# Patient Record
Sex: MALE | Race: WHITE | Employment: OTHER | URBAN - METROPOLITAN AREA
[De-identification: names, ages, dates, MRNs, and addresses within clinical notes are randomized per-mention and may not be internally consistent; named-entity substitution may affect disease eponyms.]

---

## 2018-04-04 ENCOUNTER — HOSPITAL ENCOUNTER (OUTPATIENT)
Age: 73
Setting detail: SPECIMEN
Discharge: HOME | End: 2018-04-04
Payer: MEDICARE

## 2018-04-04 DIAGNOSIS — Z13.220 SCREENING CHOLESTEROL LEVEL: ICD-10-CM

## 2018-04-04 DIAGNOSIS — L40.50 PSA (PSORIATIC ARTHRITIS) (HCC): ICD-10-CM

## 2018-04-04 DIAGNOSIS — R53.83 FATIGUE, UNSPECIFIED TYPE: ICD-10-CM

## 2018-04-04 DIAGNOSIS — Z00.00 WELL ADULT EXAM: ICD-10-CM

## 2018-04-04 DIAGNOSIS — E55.9 VITAMIN D DEFICIENCY: ICD-10-CM

## 2018-04-04 DIAGNOSIS — R97.20 ABNORMAL PSA: ICD-10-CM

## 2018-04-04 LAB
ABSOLUTE EOS #: 0.14 K/UL (ref 0–0.44)
ABSOLUTE IMMATURE GRANULOCYTE: <0.03 K/UL (ref 0–0.3)
ABSOLUTE LYMPH #: 1.57 K/UL (ref 1.1–3.7)
ABSOLUTE MONO #: 0.43 K/UL (ref 0.1–1.2)
ALBUMIN SERPL-MCNC: 4.7 G/DL (ref 3.5–5.2)
ALBUMIN/GLOBULIN RATIO: 1.6 (ref 1–2.5)
ALP BLD-CCNC: 89 U/L (ref 40–129)
ALT SERPL-CCNC: 28 U/L (ref 5–41)
ANION GAP SERPL CALCULATED.3IONS-SCNC: 14 MMOL/L (ref 9–17)
AST SERPL-CCNC: 22 U/L
BASOPHILS # BLD: 0 % (ref 0–2)
BASOPHILS ABSOLUTE: <0.03 K/UL (ref 0–0.2)
BILIRUB SERPL-MCNC: 0.87 MG/DL (ref 0.3–1.2)
BUN BLDV-MCNC: 11 MG/DL (ref 8–23)
BUN/CREAT BLD: NORMAL (ref 9–20)
CALCIUM SERPL-MCNC: 9.4 MG/DL (ref 8.6–10.4)
CHLORIDE BLD-SCNC: 103 MMOL/L (ref 98–107)
CHOLESTEROL/HDL RATIO: 4.1
CHOLESTEROL: 207 MG/DL
CO2: 26 MMOL/L (ref 20–31)
CREAT SERPL-MCNC: 0.78 MG/DL (ref 0.7–1.2)
DIFFERENTIAL TYPE: ABNORMAL
EOSINOPHILS RELATIVE PERCENT: 2 % (ref 1–4)
GFR AFRICAN AMERICAN: >60 ML/MIN
GFR NON-AFRICAN AMERICAN: >60 ML/MIN
GFR SERPL CREATININE-BSD FRML MDRD: NORMAL ML/MIN/{1.73_M2}
GFR SERPL CREATININE-BSD FRML MDRD: NORMAL ML/MIN/{1.73_M2}
GLUCOSE FASTING: 85 MG/DL (ref 70–99)
HCT VFR BLD CALC: 46.8 % (ref 40.7–50.3)
HDLC SERPL-MCNC: 51 MG/DL
HEMOGLOBIN: 15.7 G/DL (ref 13–17)
IMMATURE GRANULOCYTES: 0 %
LDL CHOLESTEROL: 126 MG/DL (ref 0–130)
LYMPHOCYTES # BLD: 25 % (ref 24–43)
MCH RBC QN AUTO: 29.5 PG (ref 25.2–33.5)
MCHC RBC AUTO-ENTMCNC: 33.5 G/DL (ref 28.4–34.8)
MCV RBC AUTO: 88 FL (ref 82.6–102.9)
MONOCYTES # BLD: 7 % (ref 3–12)
NRBC AUTOMATED: 0 PER 100 WBC
PDW BLD-RTO: 13.2 % (ref 11.8–14.4)
PLATELET # BLD: 220 K/UL (ref 138–453)
PLATELET ESTIMATE: ABNORMAL
PMV BLD AUTO: 11.1 FL (ref 8.1–13.5)
POTASSIUM SERPL-SCNC: 4.2 MMOL/L (ref 3.7–5.3)
PROSTATE SPECIFIC ANTIGEN: 2.27 UG/L
RBC # BLD: 5.32 M/UL (ref 4.21–5.77)
RBC # BLD: ABNORMAL 10*6/UL
SEG NEUTROPHILS: 66 % (ref 36–65)
SEGMENTED NEUTROPHILS ABSOLUTE COUNT: 4.14 K/UL (ref 1.5–8.1)
SODIUM BLD-SCNC: 143 MMOL/L (ref 135–144)
T3 TOTAL: 135 NG/DL (ref 80–200)
T4 TOTAL: 7.8 UG/DL (ref 4.5–12)
TOTAL PROTEIN: 7.7 G/DL (ref 6.4–8.3)
TRIGL SERPL-MCNC: 150 MG/DL
TSH SERPL DL<=0.05 MIU/L-ACNC: 1.41 MIU/L (ref 0.3–5)
VITAMIN B-12: 1105 PG/ML (ref 232–1245)
VITAMIN D 25-HYDROXY: 46.6 NG/ML (ref 30–100)
VLDLC SERPL CALC-MCNC: ABNORMAL MG/DL (ref 1–30)
WBC # BLD: 6.3 K/UL (ref 3.5–11.3)
WBC # BLD: ABNORMAL 10*3/UL

## 2019-04-08 ENCOUNTER — HOSPITAL ENCOUNTER (OUTPATIENT)
Age: 74
Setting detail: SPECIMEN
Discharge: HOME | End: 2019-04-08
Payer: MEDICARE

## 2019-04-08 DIAGNOSIS — E07.9 THYROID DISORDER: ICD-10-CM

## 2019-04-08 DIAGNOSIS — R73.01 IFG (IMPAIRED FASTING GLUCOSE): ICD-10-CM

## 2019-04-08 DIAGNOSIS — E78.9 LIPID DISORDER: ICD-10-CM

## 2019-04-08 DIAGNOSIS — I10 ESSENTIAL HYPERTENSION: ICD-10-CM

## 2019-04-08 DIAGNOSIS — N42.9 PROSTATE DISORDER: ICD-10-CM

## 2019-04-08 DIAGNOSIS — E55.9 VITAMIN D DEFICIENCY: ICD-10-CM

## 2019-04-08 DIAGNOSIS — E53.8 VITAMIN B 12 DEFICIENCY: ICD-10-CM

## 2019-04-08 LAB
ALBUMIN SERPL-MCNC: 5 G/DL (ref 3.5–5.2)
ALBUMIN/GLOBULIN RATIO: 1.9 (ref 1–2.5)
ALP BLD-CCNC: 85 U/L (ref 40–129)
ALT SERPL-CCNC: 27 U/L (ref 5–41)
ANION GAP SERPL CALCULATED.3IONS-SCNC: 11 MMOL/L (ref 9–17)
AST SERPL-CCNC: 20 U/L
BILIRUB SERPL-MCNC: 0.69 MG/DL (ref 0.3–1.2)
BUN BLDV-MCNC: 10 MG/DL (ref 8–23)
BUN/CREAT BLD: NORMAL (ref 9–20)
CALCIUM SERPL-MCNC: 10 MG/DL (ref 8.6–10.4)
CHLORIDE BLD-SCNC: 104 MMOL/L (ref 98–107)
CHOLESTEROL/HDL RATIO: 3.9
CHOLESTEROL: 186 MG/DL
CO2: 27 MMOL/L (ref 20–31)
CREAT SERPL-MCNC: 0.83 MG/DL (ref 0.7–1.2)
GFR AFRICAN AMERICAN: >60 ML/MIN
GFR NON-AFRICAN AMERICAN: >60 ML/MIN
GFR SERPL CREATININE-BSD FRML MDRD: NORMAL ML/MIN/{1.73_M2}
GFR SERPL CREATININE-BSD FRML MDRD: NORMAL ML/MIN/{1.73_M2}
GLUCOSE FASTING: 90 MG/DL (ref 70–99)
HCT VFR BLD CALC: 47.4 % (ref 40.7–50.3)
HDLC SERPL-MCNC: 48 MG/DL
HEMOGLOBIN: 15.9 G/DL (ref 13–17)
LDL CHOLESTEROL: 105 MG/DL (ref 0–130)
MCH RBC QN AUTO: 29.7 PG (ref 25.2–33.5)
MCHC RBC AUTO-ENTMCNC: 33.5 G/DL (ref 28.4–34.8)
MCV RBC AUTO: 88.4 FL (ref 82.6–102.9)
NRBC AUTOMATED: 0 PER 100 WBC
PDW BLD-RTO: 13.2 % (ref 11.8–14.4)
PLATELET # BLD: 243 K/UL (ref 138–453)
PMV BLD AUTO: 10.8 FL (ref 8.1–13.5)
POTASSIUM SERPL-SCNC: 4.1 MMOL/L (ref 3.7–5.3)
PROSTATE SPECIFIC ANTIGEN: 2.37 UG/L
RBC # BLD: 5.36 M/UL (ref 4.21–5.77)
SODIUM BLD-SCNC: 142 MMOL/L (ref 135–144)
T4 TOTAL: 8.1 UG/DL (ref 4.5–12)
TOTAL PROTEIN: 7.7 G/DL (ref 6.4–8.3)
TRIGL SERPL-MCNC: 166 MG/DL
TSH SERPL DL<=0.05 MIU/L-ACNC: 1.47 MIU/L (ref 0.3–5)
VITAMIN B-12: 978 PG/ML (ref 232–1245)
VITAMIN D 25-HYDROXY: 59.6 NG/ML (ref 30–100)
VLDLC SERPL CALC-MCNC: ABNORMAL MG/DL (ref 1–30)
WBC # BLD: 5.5 K/UL (ref 3.5–11.3)

## 2020-12-02 ENCOUNTER — ANESTHESIA EVENT (OUTPATIENT)
Dept: OPERATING ROOM | Age: 75
DRG: 419 | End: 2020-12-02
Payer: MEDICARE

## 2020-12-02 ENCOUNTER — APPOINTMENT (OUTPATIENT)
Dept: CT IMAGING | Age: 75
DRG: 419 | End: 2020-12-02
Payer: MEDICARE

## 2020-12-02 ENCOUNTER — APPOINTMENT (OUTPATIENT)
Dept: ULTRASOUND IMAGING | Age: 75
DRG: 419 | End: 2020-12-02
Payer: MEDICARE

## 2020-12-02 ENCOUNTER — HOSPITAL ENCOUNTER (INPATIENT)
Age: 75
LOS: 2 days | Discharge: HOME | DRG: 419 | End: 2020-12-04
Attending: EMERGENCY MEDICINE | Admitting: INTERNAL MEDICINE
Payer: MEDICARE

## 2020-12-02 PROBLEM — K81.0 ACUTE CHOLECYSTITIS: Status: ACTIVE | Noted: 2020-12-02

## 2020-12-02 LAB
ABSOLUTE EOS #: 0.13 K/UL (ref 0–0.44)
ABSOLUTE IMMATURE GRANULOCYTE: 0.06 K/UL (ref 0–0.3)
ABSOLUTE LYMPH #: 1.29 K/UL (ref 1.1–3.7)
ABSOLUTE MONO #: 0.66 K/UL (ref 0.1–1.2)
ALBUMIN SERPL-MCNC: 5 G/DL (ref 3.5–5.2)
ALBUMIN/GLOBULIN RATIO: ABNORMAL (ref 1–2.5)
ALP BLD-CCNC: 83 U/L (ref 40–129)
ALT SERPL-CCNC: 22 U/L (ref 5–41)
ANION GAP SERPL CALCULATED.3IONS-SCNC: 11 MMOL/L (ref 9–17)
AST SERPL-CCNC: 19 U/L
BASOPHILS # BLD: 0 % (ref 0–2)
BASOPHILS ABSOLUTE: 0.04 K/UL (ref 0–0.2)
BILIRUB SERPL-MCNC: 0.48 MG/DL (ref 0.3–1.2)
BUN BLDV-MCNC: 18 MG/DL (ref 8–23)
BUN/CREAT BLD: 20 (ref 9–20)
CALCIUM SERPL-MCNC: 9.7 MG/DL (ref 8.6–10.4)
CHLORIDE BLD-SCNC: 101 MMOL/L (ref 98–107)
CO2: 25 MMOL/L (ref 20–31)
CREAT SERPL-MCNC: 0.89 MG/DL (ref 0.7–1.2)
DIFFERENTIAL TYPE: ABNORMAL
EKG ATRIAL RATE: 71 BPM
EKG P AXIS: 57 DEGREES
EKG P-R INTERVAL: 176 MS
EKG Q-T INTERVAL: 394 MS
EKG QRS DURATION: 84 MS
EKG QTC CALCULATION (BAZETT): 428 MS
EKG R AXIS: 46 DEGREES
EKG T AXIS: 39 DEGREES
EKG VENTRICULAR RATE: 71 BPM
EOSINOPHILS RELATIVE PERCENT: 1 % (ref 1–4)
GFR AFRICAN AMERICAN: >60 ML/MIN
GFR NON-AFRICAN AMERICAN: >60 ML/MIN
GFR SERPL CREATININE-BSD FRML MDRD: ABNORMAL ML/MIN/{1.73_M2}
GFR SERPL CREATININE-BSD FRML MDRD: ABNORMAL ML/MIN/{1.73_M2}
GLUCOSE BLD-MCNC: 163 MG/DL (ref 70–99)
HCT VFR BLD CALC: 43.2 % (ref 40.7–50.3)
HEMOGLOBIN: 15 G/DL (ref 13–17)
IMMATURE GRANULOCYTES: 1 %
LIPASE: 35 U/L (ref 13–60)
LYMPHOCYTES # BLD: 12 % (ref 24–43)
MCH RBC QN AUTO: 29.6 PG (ref 25.2–33.5)
MCHC RBC AUTO-ENTMCNC: 34.7 G/DL (ref 28.4–34.8)
MCV RBC AUTO: 85.2 FL (ref 82.6–102.9)
MONOCYTES # BLD: 6 % (ref 3–12)
NRBC AUTOMATED: 0 PER 100 WBC
PDW BLD-RTO: 12.7 % (ref 11.8–14.4)
PLATELET # BLD: 215 K/UL (ref 138–453)
PLATELET ESTIMATE: ABNORMAL
PMV BLD AUTO: 10.3 FL (ref 8.1–13.5)
POTASSIUM SERPL-SCNC: 3.9 MMOL/L (ref 3.7–5.3)
RBC # BLD: 5.07 M/UL (ref 4.21–5.77)
RBC # BLD: ABNORMAL 10*6/UL
SARS-COV-2, RAPID: NOT DETECTED
SARS-COV-2: NORMAL
SARS-COV-2: NORMAL
SEG NEUTROPHILS: 80 % (ref 36–65)
SEGMENTED NEUTROPHILS ABSOLUTE COUNT: 8.34 K/UL (ref 1.5–8.1)
SODIUM BLD-SCNC: 137 MMOL/L (ref 135–144)
SOURCE: NORMAL
TOTAL PROTEIN: 7.2 G/DL (ref 6.4–8.3)
TROPONIN INTERP: NORMAL
TROPONIN T: NORMAL NG/ML
TROPONIN, HIGH SENSITIVITY: 7 NG/L (ref 0–22)
WBC # BLD: 10.5 K/UL (ref 3.5–11.3)
WBC # BLD: ABNORMAL 10*3/UL

## 2020-12-02 PROCEDURE — 93010 ELECTROCARDIOGRAM REPORT: CPT | Performed by: INTERNAL MEDICINE

## 2020-12-02 PROCEDURE — 99222 1ST HOSP IP/OBS MODERATE 55: CPT | Performed by: INTERNAL MEDICINE

## 2020-12-02 PROCEDURE — 1200000000 HC SEMI PRIVATE

## 2020-12-02 PROCEDURE — 2580000003 HC RX 258: Performed by: EMERGENCY MEDICINE

## 2020-12-02 PROCEDURE — 85025 COMPLETE CBC W/AUTO DIFF WBC: CPT

## 2020-12-02 PROCEDURE — 36415 COLL VENOUS BLD VENIPUNCTURE: CPT

## 2020-12-02 PROCEDURE — 2500000003 HC RX 250 WO HCPCS: Performed by: NURSE PRACTITIONER

## 2020-12-02 PROCEDURE — 96375 TX/PRO/DX INJ NEW DRUG ADDON: CPT

## 2020-12-02 PROCEDURE — 80053 COMPREHEN METABOLIC PANEL: CPT

## 2020-12-02 PROCEDURE — 6360000004 HC RX CONTRAST MEDICATION: Performed by: EMERGENCY MEDICINE

## 2020-12-02 PROCEDURE — 76705 ECHO EXAM OF ABDOMEN: CPT

## 2020-12-02 PROCEDURE — 74177 CT ABD & PELVIS W/CONTRAST: CPT

## 2020-12-02 PROCEDURE — 83690 ASSAY OF LIPASE: CPT

## 2020-12-02 PROCEDURE — 93005 ELECTROCARDIOGRAM TRACING: CPT | Performed by: EMERGENCY MEDICINE

## 2020-12-02 PROCEDURE — 6360000002 HC RX W HCPCS: Performed by: EMERGENCY MEDICINE

## 2020-12-02 PROCEDURE — 99284 EMERGENCY DEPT VISIT MOD MDM: CPT

## 2020-12-02 PROCEDURE — 6360000002 HC RX W HCPCS: Performed by: NURSE PRACTITIONER

## 2020-12-02 PROCEDURE — APPSS60 APP SPLIT SHARED TIME 46-60 MINUTES: Performed by: NURSE PRACTITIONER

## 2020-12-02 PROCEDURE — 2580000003 HC RX 258: Performed by: NURSE PRACTITIONER

## 2020-12-02 PROCEDURE — 6370000000 HC RX 637 (ALT 250 FOR IP): Performed by: NURSE PRACTITIONER

## 2020-12-02 PROCEDURE — 6370000000 HC RX 637 (ALT 250 FOR IP): Performed by: EMERGENCY MEDICINE

## 2020-12-02 PROCEDURE — 87040 BLOOD CULTURE FOR BACTERIA: CPT

## 2020-12-02 PROCEDURE — 84484 ASSAY OF TROPONIN QUANT: CPT

## 2020-12-02 PROCEDURE — U0002 COVID-19 LAB TEST NON-CDC: HCPCS

## 2020-12-02 RX ORDER — ONDANSETRON 2 MG/ML
4 INJECTION INTRAMUSCULAR; INTRAVENOUS EVERY 6 HOURS PRN
Status: DISCONTINUED | OUTPATIENT
Start: 2020-12-02 | End: 2020-12-04 | Stop reason: HOSPADM

## 2020-12-02 RX ORDER — DEXTROSE, SODIUM CHLORIDE, AND POTASSIUM CHLORIDE 5; .45; .15 G/100ML; G/100ML; G/100ML
INJECTION INTRAVENOUS CONTINUOUS
Status: DISCONTINUED | OUTPATIENT
Start: 2020-12-02 | End: 2020-12-03

## 2020-12-02 RX ORDER — NICOTINE 21 MG/24HR
1 PATCH, TRANSDERMAL 24 HOURS TRANSDERMAL DAILY PRN
Status: DISCONTINUED | OUTPATIENT
Start: 2020-12-02 | End: 2020-12-04 | Stop reason: HOSPADM

## 2020-12-02 RX ORDER — POLYETHYLENE GLYCOL 3350 17 G/17G
17 POWDER, FOR SOLUTION ORAL DAILY PRN
Status: DISCONTINUED | OUTPATIENT
Start: 2020-12-02 | End: 2020-12-04 | Stop reason: HOSPADM

## 2020-12-02 RX ORDER — ONDANSETRON 4 MG/1
4 TABLET, ORALLY DISINTEGRATING ORAL EVERY 8 HOURS PRN
Status: DISCONTINUED | OUTPATIENT
Start: 2020-12-02 | End: 2020-12-04 | Stop reason: HOSPADM

## 2020-12-02 RX ORDER — SODIUM CHLORIDE 0.9 % (FLUSH) 0.9 %
10 SYRINGE (ML) INJECTION ONCE
Status: COMPLETED | OUTPATIENT
Start: 2020-12-02 | End: 2020-12-02

## 2020-12-02 RX ORDER — HYDRALAZINE HYDROCHLORIDE 20 MG/ML
10 INJECTION INTRAMUSCULAR; INTRAVENOUS EVERY 6 HOURS PRN
Status: DISCONTINUED | OUTPATIENT
Start: 2020-12-02 | End: 2020-12-04 | Stop reason: HOSPADM

## 2020-12-02 RX ORDER — METOPROLOL TARTRATE 5 MG/5ML
5 INJECTION INTRAVENOUS ONCE
Status: COMPLETED | OUTPATIENT
Start: 2020-12-02 | End: 2020-12-02

## 2020-12-02 RX ORDER — MULTIVIT WITH MINERALS/LUTEIN
1 TABLET ORAL DAILY
COMMUNITY

## 2020-12-02 RX ORDER — POTASSIUM CHLORIDE 7.45 MG/ML
10 INJECTION INTRAVENOUS PRN
Status: DISCONTINUED | OUTPATIENT
Start: 2020-12-02 | End: 2020-12-04 | Stop reason: HOSPADM

## 2020-12-02 RX ORDER — SODIUM CHLORIDE 0.9 % (FLUSH) 0.9 %
10 SYRINGE (ML) INJECTION PRN
Status: DISCONTINUED | OUTPATIENT
Start: 2020-12-02 | End: 2020-12-04 | Stop reason: HOSPADM

## 2020-12-02 RX ORDER — ONDANSETRON 2 MG/ML
4 INJECTION INTRAMUSCULAR; INTRAVENOUS ONCE
Status: COMPLETED | OUTPATIENT
Start: 2020-12-02 | End: 2020-12-02

## 2020-12-02 RX ORDER — TIMOLOL MALEATE 5 MG/ML
1 SOLUTION/ DROPS OPHTHALMIC 2 TIMES DAILY
Status: DISCONTINUED | OUTPATIENT
Start: 2020-12-02 | End: 2020-12-04 | Stop reason: HOSPADM

## 2020-12-02 RX ORDER — 0.9 % SODIUM CHLORIDE 0.9 %
80 INTRAVENOUS SOLUTION INTRAVENOUS ONCE
Status: COMPLETED | OUTPATIENT
Start: 2020-12-02 | End: 2020-12-02

## 2020-12-02 RX ORDER — MORPHINE SULFATE 4 MG/ML
4 INJECTION, SOLUTION INTRAMUSCULAR; INTRAVENOUS ONCE
Status: COMPLETED | OUTPATIENT
Start: 2020-12-02 | End: 2020-12-02

## 2020-12-02 RX ORDER — MAGNESIUM SULFATE 1 G/100ML
1 INJECTION INTRAVENOUS PRN
Status: DISCONTINUED | OUTPATIENT
Start: 2020-12-02 | End: 2020-12-04 | Stop reason: HOSPADM

## 2020-12-02 RX ORDER — POTASSIUM CHLORIDE 20 MEQ/1
40 TABLET, EXTENDED RELEASE ORAL PRN
Status: DISCONTINUED | OUTPATIENT
Start: 2020-12-02 | End: 2020-12-04 | Stop reason: HOSPADM

## 2020-12-02 RX ORDER — TIMOLOL MALEATE 5 MG/ML
1 SOLUTION OPHTHALMIC DAILY
COMMUNITY
End: 2021-12-13 | Stop reason: ALTCHOICE

## 2020-12-02 RX ORDER — LATANOPROST 50 UG/ML
1 SOLUTION/ DROPS OPHTHALMIC NIGHTLY
Status: DISCONTINUED | OUTPATIENT
Start: 2020-12-02 | End: 2020-12-04 | Stop reason: HOSPADM

## 2020-12-02 RX ORDER — ACETAMINOPHEN 325 MG/1
650 TABLET ORAL EVERY 6 HOURS PRN
Status: DISCONTINUED | OUTPATIENT
Start: 2020-12-02 | End: 2020-12-04 | Stop reason: HOSPADM

## 2020-12-02 RX ORDER — 0.9 % SODIUM CHLORIDE 0.9 %
1000 INTRAVENOUS SOLUTION INTRAVENOUS ONCE
Status: COMPLETED | OUTPATIENT
Start: 2020-12-02 | End: 2020-12-02

## 2020-12-02 RX ORDER — SODIUM CHLORIDE 0.9 % (FLUSH) 0.9 %
10 SYRINGE (ML) INJECTION EVERY 12 HOURS SCHEDULED
Status: DISCONTINUED | OUTPATIENT
Start: 2020-12-02 | End: 2020-12-04 | Stop reason: HOSPADM

## 2020-12-02 RX ORDER — HYDROMORPHONE HYDROCHLORIDE 1 MG/ML
1 INJECTION, SOLUTION INTRAMUSCULAR; INTRAVENOUS; SUBCUTANEOUS ONCE
Status: COMPLETED | OUTPATIENT
Start: 2020-12-02 | End: 2020-12-02

## 2020-12-02 RX ORDER — ONDANSETRON 4 MG/1
4 TABLET, ORALLY DISINTEGRATING ORAL ONCE
Status: COMPLETED | OUTPATIENT
Start: 2020-12-02 | End: 2020-12-02

## 2020-12-02 RX ORDER — ACETAMINOPHEN 650 MG/1
650 SUPPOSITORY RECTAL EVERY 6 HOURS PRN
Status: DISCONTINUED | OUTPATIENT
Start: 2020-12-02 | End: 2020-12-04

## 2020-12-02 RX ADMIN — METOPROLOL TARTRATE 25 MG: 25 TABLET, FILM COATED ORAL at 17:09

## 2020-12-02 RX ADMIN — POTASSIUM CHLORIDE, DEXTROSE MONOHYDRATE AND SODIUM CHLORIDE: 150; 5; 450 INJECTION, SOLUTION INTRAVENOUS at 06:54

## 2020-12-02 RX ADMIN — PIPERACILLIN AND TAZOBACTAM 3.38 G: 3; .375 INJECTION, POWDER, LYOPHILIZED, FOR SOLUTION INTRAVENOUS at 12:10

## 2020-12-02 RX ADMIN — SODIUM CHLORIDE 80 ML: 9 INJECTION, SOLUTION INTRAVENOUS at 03:37

## 2020-12-02 RX ADMIN — FAMOTIDINE 20 MG: 10 INJECTION INTRAVENOUS at 23:12

## 2020-12-02 RX ADMIN — FAMOTIDINE 20 MG: 10 INJECTION INTRAVENOUS at 12:11

## 2020-12-02 RX ADMIN — METOPROLOL TARTRATE 5 MG: 5 INJECTION INTRAVENOUS at 09:37

## 2020-12-02 RX ADMIN — TIMOLOL MALEATE 1 DROP: 5 SOLUTION/ DROPS OPHTHALMIC at 22:04

## 2020-12-02 RX ADMIN — POTASSIUM CHLORIDE, DEXTROSE MONOHYDRATE AND SODIUM CHLORIDE: 150; 5; 450 INJECTION, SOLUTION INTRAVENOUS at 17:13

## 2020-12-02 RX ADMIN — ONDANSETRON 4 MG: 4 TABLET, ORALLY DISINTEGRATING ORAL at 04:23

## 2020-12-02 RX ADMIN — SODIUM CHLORIDE 1000 ML: 9 INJECTION, SOLUTION INTRAVENOUS at 02:40

## 2020-12-02 RX ADMIN — ONDANSETRON 4 MG: 2 INJECTION INTRAMUSCULAR; INTRAVENOUS at 02:40

## 2020-12-02 RX ADMIN — PIPERACILLIN AND TAZOBACTAM 3.38 G: 3; .375 INJECTION, POWDER, LYOPHILIZED, FOR SOLUTION INTRAVENOUS at 18:30

## 2020-12-02 RX ADMIN — HYDROMORPHONE HYDROCHLORIDE 1 MG: 1 INJECTION, SOLUTION INTRAMUSCULAR; INTRAVENOUS; SUBCUTANEOUS at 04:23

## 2020-12-02 RX ADMIN — Medication 10 ML: at 03:36

## 2020-12-02 RX ADMIN — IOPAMIDOL 75 ML: 755 INJECTION, SOLUTION INTRAVENOUS at 03:36

## 2020-12-02 RX ADMIN — MORPHINE SULFATE 4 MG: 4 INJECTION, SOLUTION INTRAMUSCULAR; INTRAVENOUS at 02:40

## 2020-12-02 RX ADMIN — LATANOPROST 1 DROP: 50 SOLUTION OPHTHALMIC at 22:05

## 2020-12-02 RX ADMIN — PIPERACILLIN SODIUM AND TAZOBACTAM SODIUM 4.5 G: 4; .5 INJECTION, POWDER, LYOPHILIZED, FOR SOLUTION INTRAVENOUS at 05:00

## 2020-12-02 ASSESSMENT — PAIN DESCRIPTION - PAIN TYPE
TYPE: ACUTE PAIN
TYPE: ACUTE PAIN

## 2020-12-02 ASSESSMENT — PAIN SCALES - GENERAL
PAINLEVEL_OUTOF10: 9
PAINLEVEL_OUTOF10: 0
PAINLEVEL_OUTOF10: 10
PAINLEVEL_OUTOF10: 10

## 2020-12-02 ASSESSMENT — PAIN DESCRIPTION - FREQUENCY: FREQUENCY: CONTINUOUS

## 2020-12-02 ASSESSMENT — PAIN DESCRIPTION - DESCRIPTORS: DESCRIPTORS: ACHING

## 2020-12-02 ASSESSMENT — PAIN DESCRIPTION - LOCATION: LOCATION: ABDOMEN

## 2020-12-02 NOTE — CONSULTS
General Surgery:  Consult Note        PATIENT NAME: Al Valdes   YOB: 1945    ADMISSION DATE: 12/2/2020  1:48 AM     Admitting Provider: Marilee Hanley Physician: Dr. Lola Serrano: 12/2/2020    Chief Complaint:  Abdominal pain, n/v.  Consult Regarding:  Possible acute cholecystitis     HISTORY OF PRESENT ILLNESS:  The patient is a 70 y.o. male w/ h/o htn, cad who scented to the hospital with acute onset abdominal pain. States pain is all over. It started last night around 1030 after he had greasy spaghetti for dinner. He was nauseated and had 3 episodes of emesis. Pain persisted which prompted him to come to the emergency department. States that he had felt warm, no chills. No chest pain or difficulties breathing. During evaluation patient had a CT of his abdomen pelvis that had a distended gallbladder with a gallstone in the neck. No leukocytosis. Concern for acute cholecystitis. She states he has normal bowel function last bowel movement was yesterday without any melena or hematochezia. Prior to yesterday evening patient was tolerating a diet without nausea or vomiting he was in good health. he is overall active. Does have a history of heart catheterization without placement of stent. He takes medications for his blood pressure and daily baby aspirin. No prior abdominal surgery. Denies tobacco, alcohol or illicit drug use. Past Medical History:        Diagnosis Date    Hypertension        Past Surgical History:        Procedure Laterality Date    CARDIAC CATHETERIZATION  2006    [facility]       Medications Prior to Admission:   Not in a hospital admission. Allergies:  Patient has no known allergies.     Social History:   Social History     Socioeconomic History    Marital status:      Spouse name: Not on file    Number of children: Not on file    Years of education: Not on file    Highest education level: Not on file   Occupational History    Not on file   Social Needs    Financial resource strain: Not hard at all   [place] insecurity     Worry: Never true     Inability: Never true   [employer] needs     Medical: No     Non-medical: No   Tobacco Use    Smoking status: Never Smoker    Smokeless tobacco: Never Used   Substance and Sexual Activity    Alcohol use: No     Alcohol/week: 0.0 standard drinks    Drug use: Not on file    Sexual activity: Yes     Partners: Female   Lifestyle    Physical activity     Days per week: Not on file     Minutes per session: Not on file    Stress: Not on file   Relationships    Social connections     Talks on phone: Not on file     Gets together: Not on file     Attends [employer]ism service: Not on file     Active member of club or organization: Not on file     Attends meetings of clubs or organizations: Not on file     Relationship status: Not on file    Intimate partner violence     Fear of current or ex partner: Not on file     Emotionally abused: Not on file     Physically abused: Not on file     Forced sexual activity: Not on file   Other Topics Concern    Not on file   Social History Narrative    Not on file       Family History:       Problem Relation Age of Onset    Cancer Mother     Cirrhosis Father        REVIEW OF SYSTEMS:    CONSTITUTIONAL: Denies recent weight loss, fatigue, fevers, chills. HEENT: Denies rhinorrhea, dysphagia, odynphagia. CARDIOVASCULAR:CAD Denies history recent chest pain. RESPIRATORY: Denies recent history of shortness of breath or history of PE. GASTROINTESTINAL:see hpi  GENITOURINARY: Denies increased frequency or dysuria. HEMATOLOGIC/LYMPHATIC: Denies history of anemia or DVTs. ENDOCRINE: Denies history of thyroid problems or diabetes. NEURO: Denies history of CVA, TIA. Review of systems negative unless listed above.     PHYSICAL EXAM:    VITALS:  BP (!) 182/111   Pulse 69   Temp 98.4 °F (36.9 °C) (Oral)   Resp 16   Ht 5' 10\" (1.778 m)   Wt 178 lb (80.7 kg)   SpO2 98%   BMI 25.54 kg/m²   INTAKE/OUTPUT:   No intake or output data in the 24 hours ending 12/02/20 0523    CONSTITUTIONAL:  awake, alert, not distressed and mildly obese  HEENT: Normocephalic/atraumatic, without obvious abnormality. NECK:  Supple, symmetrical, trachea midline   CARDIOVASCULAR: s1+s2  LUNGS: equal and symmetric chest rise/fall, non-labored   ABDOMEN: soft, nd, ttp RUQ w/ localized guarding. Neg plaat. No scars noted. MUSCULOSKELETAL: Muscle strength intact in all extremities bilaterally. NEUROLOGIC: CN II- XII intact. Gross motor intact without focal weakness. SKIN: No cyanosis, rashes, or edema noted.    Orientation:   oriented to person, place, and time      CBC with Differential:    Lab Results   Component Value Date    WBC 10.5 12/02/2020    RBC 5.07 12/02/2020    HGB 15.0 12/02/2020    HCT 43.2 12/02/2020     12/02/2020    MCV 85.2 12/02/2020    MCH 29.6 12/02/2020    MCHC 34.7 12/02/2020    RDW 12.7 12/02/2020    LYMPHOPCT 12 12/02/2020    MONOPCT 6 12/02/2020    BASOPCT 0 12/02/2020    MONOSABS 0.66 12/02/2020    LYMPHSABS 1.29 12/02/2020    EOSABS 0.13 12/02/2020    BASOSABS 0.04 12/02/2020    DIFFTYPE NOT REPORTED 12/02/2020     CMP:    Lab Results   Component Value Date     12/02/2020    K 3.9 12/02/2020     12/02/2020    CO2 25 12/02/2020    BUN 18 12/02/2020    CREATININE 0.89 12/02/2020    GFRAA >60 12/02/2020    LABGLOM >60 12/02/2020    GLUCOSE 163 12/02/2020    PROT 7.2 12/02/2020    LABALBU 5.0 12/02/2020    CALCIUM 9.7 12/02/2020    BILITOT 0.48 12/02/2020    ALKPHOS 83 12/02/2020    AST 19 12/02/2020    ALT 22 12/02/2020     BMP:    Lab Results   Component Value Date     12/02/2020    K 3.9 12/02/2020     12/02/2020    CO2 25 12/02/2020    BUN 18 12/02/2020    LABALBU 5.0 12/02/2020    CREATININE 0.89 12/02/2020    CALCIUM 9.7 12/02/2020    GFRAA >60 12/02/2020    LABGLOM >60 12/02/2020    GLUCOSE 163 12/02/2020     Hepatic Function Panel:    Lab Results   Component Value Date    ALKPHOS 83 12/02/2020    ALT 22 12/02/2020    AST 19 12/02/2020    PROT 7.2 12/02/2020    BILITOT 0.48 12/02/2020    LABALBU 5.0 12/02/2020       Pertinent Radiology:   Ct Abdomen Pelvis W Iv Contrast Additional Contrast? None    Result Date: 12/2/2020  EXAMINATION: CT OF THE ABDOMEN AND PELVIS WITH CONTRAST 12/2/2020 3:16 am TECHNIQUE: CT of the abdomen and pelvis was performed with the administration of intravenous contrast. Multiplanar reformatted images are provided for review. Dose modulation, iterative reconstruction, and/or weight based adjustment of the mA/kV was utilized to reduce the radiation dose to as low as reasonably achievable. COMPARISON: None. HISTORY: ORDERING SYSTEM PROVIDED HISTORY: pain TECHNOLOGIST PROVIDED HISTORY: pain Reason for Exam: abd pain Acuity: Acute Type of Exam: Initial Additional signs and symptoms: N/V Relevant Medical/Surgical History: HTN FINDINGS: Lower Chest: No acute abnormality. Liver: Normal. Gallbladder and Bile Ducts: Distended gallbladder with a 5 mm intraluminal stone near the gallbladder neck. No pericholecystic fluid. No biliary ductal dilatation. Spleen: Normal. Adrenal Glands: Normal. Pancreas: Normal. Genitourinary: Both kidneys are symmetric in size and enhancement. No urinary stones or hydronephrosis. Both ureters are normal in course and caliber. Mild haziness about the urinary bladder. Prostate gland measures 5.0 x 4.0 cm. Bowel: The stomach is incompletely distended and not well evaluated. The small bowel and colon are normal in caliber. Normal appendix. Colonic diverticulosis without acute diverticulitis. . Vasculature: Normal. Bones and Soft Tissues: No acute abnormality. Retroperitoneum/Mesentery: No intraperitoneal free air, ascites or fluid collection. No lymphadenopathy in the abdomen or pelvis. 1.  Distended gallbladder with a 5 mm intraluminal stone near the gallbladder neck.   No pericholecystic fluid. Gallbladder ultrasound may be helpful for further evaluation, especially if there is high clinical concern for acute cholecystitis. 2.  No bowel obstruction. Normal appendix. 3.  Colonic diverticulosis without acute diverticulitis. 4.  Mild haziness about the urinary bladder could relate to underlying cystitis. No obstructive uropathy. ASSESSMENT:  Active Hospital Problems    Diagnosis Date Noted    Acute cholecystitis [K81.0] 12/02/2020       71 yo M acute onset abdominal pain associated w/ n/v. Most likely acute cholecystitis. Plan:  1. Rec admit to medicine  2. Will get RUQ US  3. Based on imaging and exam pt most likely with Acute cholecystitis. Cont abx, will plan for cholecystectomy, timing to be determined. 4. Keep NPO  5. IVF      Electronically signed by Dahlia Ojeda DO  on 12/2/2020 at 5:23 AM   Attending Physician Statement  I have discussed the case, including pertinent history and exam findings with the resident. I agree with the assessment, plan and orders as documented by the resident. Findings c/w acute cholecystitis.   Tentative cholecystectomy tomorrow

## 2020-12-02 NOTE — PROGRESS NOTES
Transitions of Care Pharmacy Service   Medication Review    The patient's list of current home medications has been reviewed. Source(s) of information: patient, Arabella, Epic      Please feel free to call me with any questions about this encounter. Thank you.     Alicia Acuna, [facility]   Transitions of Care Pharmacy Service  Phone:  [phone]  Fax: [phone]      Electronically signed by Alicia Acuna [facility] on 12/2/2020 at 12:53 PM           Medications Prior to Admission:   timolol (TIMOPTIC-XE) 0.5 % ophthalmic gel-forming, Place 1 drop into both eyes daily  vitamin D (CHOLECALCIFEROL) 25 MCG (1000 UT) TABS tablet, Take 1,000 Units by mouth daily  Multiple Vitamins-Minerals (CENTRUM SILVER) TABS, Take 1 tablet by mouth daily  metoprolol tartrate (LOPRESSOR) 25 MG tablet, TAKE 1 TABLET DAILY  latanoprost (XALATAN) 0.005 % ophthalmic solution, Place 1 drop into both eyes nightly   aspirin 81 MG tablet, Take 81 mg by mouth daily

## 2020-12-02 NOTE — H&P
[facility]  Office: [address], DO, So Bahman, DO, Desi Manifold, DO, Hola Arriaza Blood, DO, Con Herndon MD, Anastacio Blas MD, Karma Celeste MD, Dallas Doll MD, Gerson Jaffe MD, Gina Mahan MD, Nancy Mcclelland MD, Kyle Emery MD, Yue Velazquez MD, Jaun Lopez DO, Alva Gotti MD, Herminio Davenport MD, aFdumo Kyle DO, Al Valdes MD,  Anthony Hernandez DO, Norris Holter, MD, Madonna Forrest MD, Chayo Marin, CNP, [facility], CNP, Celine Vogel, CNP, Dhiraj Seaman, CNS, David Oropeza, CNP, London Cunningham, CNP, Ursula Mendes, CNP, Sary Francisco, CNP, Walker Vann, CNP, Loyd Cantu PA-C, Dinesh Lora [facility], Sylvia Haney, CNP, Obdulia Card, CNP, Zurdo Loop, CNP, Reginald Monae, CNP, Jessica Brandt, CNP         [address]    HISTORY AND PHYSICAL EXAMINATION            Date:   12/2/2020  Patient name:  Ariana Marmolejo  Date of admission:  12/2/2020  1:48 AM  MRN:   [id number]  Account:  [de-identified]  YOB: 1945  PCP:    Georgina Aguila MD  Room:   [address] [place] [address]  Code Status:    Full Code    Chief Complaint:     Chief Complaint   Patient presents with    Abdominal Pain       History Obtained From:     patient    History of Present Illness:     Ariana Marmolejo is a 70 y.o.  male who presents with Abdominal Pain   and is admitted to the hospital for the management of Acute cholecystitis. Patient states he started having generalized abdominal pain associated with nausea and vomiting that started at 10 pm last night and got progressively worse. He has never had any similar previous episodes. He denies any CP, SOB, diarrhea, constipation, fever or recent sick contacts. CT abd/pelvis consistent with acute cholecystitis.  His nausea and vomiting have since resolved  Past Medical History:     Past Medical History:   Diagnosis Date    GERD without esophagitis     Hypertension Past Surgical History:     Past Surgical History:   Procedure Laterality Date    [facility]AC [facility]ATION  [facility]    COLONOSCOPY      polyp removal    TONSILLECTOMY      VASECTOMY          Medications Prior to Admission:     Prior to Admission medications    Medication Sig Start Date End Date Taking? Authorizing Provider   timolol (TIMOPTIC-XE) 0.5 % ophthalmic gel-forming Place 1 drop into both eyes daily   Yes Historical Provider, MD   vitamin D (CHOLECALCIFEROL) 25 MCG (1000 UT) TABS tablet Take 1,000 Units by mouth daily   Yes Historical Provider, MD   Multiple Vitamins-Minerals (CENTRUM SILVER) TABS Take 1 tablet by mouth daily   Yes Historical Provider, MD   metoprolol tartrate (LOPRESSOR) 25 MG tablet TAKE 1 TABLET DAILY 10/9/20  Yes Isidra Zapata MD   latanoprost (XALATAN) 0.005 % ophthalmic solution Place 1 drop into both eyes nightly  2/6/19  Yes Historical Provider, MD   aspirin 81 MG tablet Take 81 mg by mouth daily   Yes Historical Provider, MD        Allergies:     Patient has no known allergies. Social History:     Tobacco:    reports that he has never smoked. He has never used smokeless tobacco.  Alcohol:      reports no history of alcohol use. Drug Use:  has no history on file for drug. Family History:     Family History   Problem Relation Age of Onset    Cancer Mother     Cirrhosis Father        Review of Systems:     Positive and Negative as described in HPI. CONSTITUTIONAL:  negative for fevers, chills, sweats, fatigue, weight loss  HEENT:  negative for vision, hearing changes, runny nose, throat pain  RESPIRATORY:  negative for shortness of breath, cough, congestion, wheezing  CARDIOVASCULAR:  negative for chest pain, palpitations  GASTROINTESTINAL:  positive for nausea, vomiting, abdominal pain.  Negative for diarrhea, constipation, change in bowel habits  GENITOURINARY:  negative for difficulty of urination, burning with urination, frequency   INTEGUMENT: negative for rash, skin lesions, easy bruising   HEMATOLOGIC/LYMPHATIC:  negative for swelling/edema   ALLERGIC/IMMUNOLOGIC:  negative for urticaria , itching  ENDOCRINE:  negative increase in drinking, increase in urination, hot or cold intolerance  MUSCULOSKELETAL:  negative joint pains, muscle aches, swelling of joints  NEUROLOGICAL:  negative for headaches, dizziness, lightheadedness, numbness, pain, tingling extremities  BEHAVIOR/PSYCH:  negative for depression, anxiety    Physical Exam:   BP (!) 169/83   Pulse 93   Temp 97 °F (36.1 °C) (Oral)   Resp 16   Ht 5' 10\" (1.778 m)   Wt 187 lb (84.8 kg)   SpO2 97%   BMI 26.83 kg/m²   Temp (24hrs), Av.7 °F (36.5 °C), Min:97 °F (36.1 °C), Max:98.4 °F (36.9 °C)    No results for input(s): POCGLU in the last 72 hours. No intake or output data in the 24 hours ending 20 1603    General Appearance:  alert, well appearing, and in no acute distress  Mental status: oriented to person, place, and time  Head:  normocephalic, atraumatic  Eye: no icterus, redness, pupils equal and reactive, extraocular eye movements intact, conjunctiva clear  Ear: normal external ear, no discharge, hearing intact  Nose:  no drainage noted  Mouth: mucous membranes moist  Neck: supple, no carotid bruits, thyroid not palpable  Lungs: Bilateral equal air entry, clear to auscultation, no wheezing, rales or rhonchi, normal effort  Cardiovascular: normal rate, regular rhythm, no murmur, gallop, rub.   Abdomen: Soft, RUQ tender to palpation, nondistended, normal bowel sounds, no hepatomegaly or splenomegaly  Neurologic: There are no new focal motor or sensory deficits, normal muscle tone and bulk, no abnormal sensation, normal speech, cranial nerves II through XII grossly intact  Skin: No gross lesions, rashes, bruising or bleeding on exposed skin area  Extremities:  peripheral pulses palpable, no pedal edema or calf pain with palpation  Psych: normal affect     Investigations: Laboratory Testing:  Recent Results (from the past 24 hour(s))   CBC Auto Differential    Collection Time: 12/02/20  2:38 AM   Result Value Ref Range    WBC 10.5 3.5 - 11.3 k/uL    RBC 5.07 4.21 - 5.77 m/uL    Hemoglobin 15.0 13.0 - 17.0 g/dL    Hematocrit 43.2 40.7 - 50.3 %    MCV 85.2 82.6 - 102.9 fL    MCH 29.6 25.2 - 33.5 pg    MCHC 34.7 28.4 - 34.8 g/dL    RDW 12.7 11.8 - 14.4 %    Platelets 687 577 - 105 k/uL    MPV 10.3 8.1 - 13.5 fL    NRBC Automated 0.0 0.0 per 100 WBC    Differential Type NOT REPORTED     Seg Neutrophils 80 (H) 36 - 65 %    Lymphocytes 12 (L) 24 - 43 %    Monocytes 6 3 - 12 %    Eosinophils % 1 1 - 4 %    Basophils 0 0 - 2 %    Immature Granulocytes 1 (H) 0 %    Segs Absolute 8.34 (H) 1.50 - 8.10 k/uL    Absolute Lymph # 1.29 1.10 - 3.70 k/uL    Absolute Mono # 0.66 0.10 - 1.20 k/uL    Absolute Eos # 0.13 0.00 - 0.44 k/uL    Basophils Absolute 0.04 0.00 - 0.20 k/uL    Absolute Immature Granulocyte 0.06 0.00 - 0.30 k/uL    WBC Morphology NOT REPORTED     RBC Morphology NOT REPORTED     Platelet Estimate NOT REPORTED    Comprehensive Metabolic Panel w/ Reflex to MG    Collection Time: 12/02/20  2:38 AM   Result Value Ref Range    Glucose 163 (H) 70 - 99 mg/dL    BUN 18 8 - 23 mg/dL    CREATININE 0.89 0.70 - 1.20 mg/dL    Bun/Cre Ratio 20 9 - 20    Calcium 9.7 8.6 - 10.4 mg/dL    Sodium 137 135 - 144 mmol/L    Potassium 3.9 3.7 - 5.3 mmol/L    Chloride 101 98 - 107 mmol/L    CO2 25 20 - 31 mmol/L    Anion Gap 11 9 - 17 mmol/L    Alkaline Phosphatase 83 40 - 129 U/L    ALT 22 5 - 41 U/L    AST 19 <40 U/L    Total Bilirubin 0.48 0.3 - 1.2 mg/dL    Total Protein 7.2 6.4 - 8.3 g/dL    Alb 5.0 3.5 - 5.2 g/dL    Albumin/Globulin Ratio NOT REPORTED 1.0 - 2.5    GFR Non-African American >60 >60 mL/min    GFR African American >60 >60 mL/min    GFR Comment          GFR Staging NOT REPORTED    Troponin    Collection Time: 12/02/20  2:38 AM   Result Value Ref Range    Troponin, High Sensitivity 7 0 - 22 ng/L    Troponin T NOT REPORTED <0.03 ng/mL    Troponin Interp NOT REPORTED    Lipase    Collection Time: 12/02/20  2:38 AM   Result Value Ref Range    Lipase 35 13 - 60 U/L   EKG 12 Lead    Collection Time: 12/02/20  2:50 AM   Result Value Ref Range    Ventricular Rate 71 BPM    Atrial Rate 71 BPM    P-R Interval 176 ms    QRS Duration 84 ms    Q-T Interval 394 ms    QTc Calculation (Bazett) 428 ms    P Axis 57 degrees    R Axis 46 degrees    T Axis 39 degrees   COVID-19    Collection Time: 12/02/20  4:53 AM    Specimen: Other   Result Value Ref Range    SARS-CoV-2          SARS-CoV-2, Rapid Not Detected Not Detected    Source . NASOPHARYNGEAL SWAB     SARS-CoV-2             Imaging/Diagnostics:  Ct Abdomen Pelvis W Iv Contrast Additional Contrast? None    Result Date: 12/2/2020  1. Distended gallbladder with a 5 mm intraluminal stone near the gallbladder neck. No pericholecystic fluid. Gallbladder ultrasound may be helpful for further evaluation, especially if there is high clinical concern for acute cholecystitis. 2.  No bowel obstruction. Normal appendix. 3.  Colonic diverticulosis without acute diverticulitis. 4.  Mild haziness about the urinary bladder could relate to underlying cystitis. No obstructive uropathy. Us Gallbladder Ruq    Result Date: 12/2/2020  Negative right upper quadrant ultrasound. RECOMMENDATIONS: If there is continued clinical concern for cholecystitis then consider further assessment with HIDA scan. Assessment :      Hospital Problems           Last Modified POA    * (Principal) Acute cholecystitis 12/2/2020 Yes    Essential hypertension 12/2/2020 Yes    Vitamin D deficiency 12/2/2020 Yes    GERD without esophagitis 12/2/2020 Yes          Plan:     Patient status inpatient in the Med/Surge    1. Resume select home meds   2. Monitor labs, replace electrolytes as needed  3. IV fluids for hydration  4. General surgery consult  5.  Full liquid diet, NPO after midnight for

## 2020-12-02 NOTE — ED PROVIDER NOTES
EMERGENCY DEPARTMENT ENCOUNTER    Pt Name: Flor Will  MRN: [id number]  [place] 1945  Date of evaluation: 20  CHIEF COMPLAINT       Chief Complaint   Patient presents with    Abdominal Pain     HISTORY OF PRESENT ILLNESS   Patient is a 72-year-old male with PMH of hypertension who presents to the ED for evaluation abdominal pain. Pain started last night approximately 10:00 PM.  Pain is generalized across abdomen. He vomited twice, abdominal pain improved for short while however worsened thereafter. He feels nauseated this time. Last bowel movement was yesterday and was normal.  No fevers, chest pain, shortness of breath, changes in urine or stool. No history of abdominal surgeries. REVIEW OF SYSTEMS     Review of Systems   All other systems reviewed and are negative. PASTMEDICAL HISTORY     Past Medical History:   Diagnosis Date    Hypertension      SURGICAL HISTORY       Past Surgical History:   Procedure Laterality Date    CARDIAC CATHETERIZATION      [facility]     CURRENT MEDICATIONS       Previous Medications    ASPIRIN 81 MG TABLET    Take 81 mg by mouth daily    LATANOPROST (XALATAN) 0.005 % OPHTHALMIC SOLUTION        METOPROLOL TARTRATE (LOPRESSOR) 25 MG TABLET    TAKE 1 TABLET DAILY     ALLERGIES     has No Known Allergies. FAMILY HISTORY     He indicated that his mother is . He indicated that his father is . He indicated that both of his brothers are alive. SOCIAL HISTORY       Social History     Tobacco Use    Smoking status: Never Smoker    Smokeless tobacco: Never Used   Substance Use Topics    Alcohol use: No     Alcohol/week: 0.0 standard drinks    Drug use: Not on file     PHYSICAL EXAM     INITIAL VITALS: BP (!) 182/111   Pulse 69   Temp 98.4 °F (36.9 °C) (Oral)   Resp 16   Ht 5' 10\" (1.778 m)   Wt 178 lb (80.7 kg)   SpO2 98%   BMI 25.54 kg/m²    Physical Exam  Constitutional:       Comments: Sitting up in bed, actively vomiting.    HENT: Head: Normocephalic. Right Ear: External ear normal.      Left Ear: External ear normal.      Nose: Nose normal.   Eyes:      Conjunctiva/sclera: Conjunctivae normal.   Cardiovascular:      Rate and Rhythm: Normal rate. Pulmonary:      Effort: Pulmonary effort is normal.   Abdominal:      General: Abdomen is flat. Tenderness: There is generalized abdominal tenderness. Skin:     General: Skin is dry. Neurological:      Mental Status: He is alert. Mental status is at baseline. Psychiatric:         Mood and Affect: Mood normal.         Behavior: Behavior normal.      Comments:     Limited exam secondary to Covid-19 pandemic. MEDICAL DECISION MAKING:   The patient is hemodynamically stable, afebrile, nontoxic-appearing. Physical exam notable for generalized abdominal tenderness. Based on history and exam differential is broad: SBO, diverticulosis, viral gastroenteritis. ED plan for basic labs, EKG, CT and pelvis, fluids, analgesia, reassess. DIAGNOSTIC RESULTS   EKG:All EKG's are interpreted by the Emergency Department Physician who either signs or Co-signs this chart in the absence of a cardiologist.        RADIOLOGY:All plain film, CT, MRI, and formal ultrasound images (except ED bedside ultrasound) are read by the radiologist, see reports below, unless otherwisenoted in MDM or here. CT ABDOMEN PELVIS W IV CONTRAST Additional Contrast? None   Final Result   1. Distended gallbladder with a 5 mm intraluminal stone near the gallbladder   neck. No pericholecystic fluid. Gallbladder ultrasound may be helpful for   further evaluation, especially if there is high clinical concern for acute   cholecystitis. 2.  No bowel obstruction. Normal appendix. 3.  Colonic diverticulosis without acute diverticulitis. 4.  Mild haziness about the urinary bladder could relate to underlying   cystitis. No obstructive uropathy.          US GALLBLADDER RUQ    (Results Pending)     LABS: All lab results were reviewed by myself, and all abnormals are listed below. Labs Reviewed   CBC WITH AUTO DIFFERENTIAL - Abnormal; Notable for the following components:       Result Value    Seg Neutrophils 80 (*)     Lymphocytes 12 (*)     Immature Granulocytes 1 (*)     Segs Absolute 8.34 (*)     All other components within normal limits   COMPREHENSIVE METABOLIC PANEL W/ REFLEX TO MG FOR LOW K - Abnormal; Notable for the following components:    Glucose 163 (*)     All other components within normal limits   CULTURE, BLOOD 1   CULTURE, BLOOD 1   TROPONIN   LIPASE   COVID-19       EMERGENCY DEPARTMENTCOURSE:   Patient requiring increased pain meds. Labs remarkable for:  Creatinine 0.89  WBC 10.5  CT scan shows evidence of acute cholecystitis, 5 mm stone in neck of gallbladder with gallbladder distention. Discussed case with surgery, Dr. Maikel Dueñas. Who agrees with plan for admission, n.p.o., antibiotic coverage, COVID-19 rapid.     Discussed case with [facility] team, Hiwot Bhandari, who accepts patient for admission to hospital.      Vitals:    Vitals:    12/02/20 0143   BP: (!) 182/111   Pulse: 69   Resp: 16   Temp: 98.4 °F (36.9 °C)   TempSrc: Oral   SpO2: 98%   Weight: 178 lb (80.7 kg)   Height: 5' 10\" (1.778 m)       The patient was given the following medications while in the emergency department:  Orders Placed This Encounter   Medications    0.9 % sodium chloride bolus    ondansetron (ZOFRAN) injection 4 mg    morphine sulfate (PF) injection 4 mg    0.9 % sodium chloride bolus    iopamidol (ISOVUE-370) 76 % injection 75 mL    sodium chloride flush 0.9 % injection 10 mL    HYDROmorphone HCl PF (DILAUDID) injection 1 mg    ondansetron (ZOFRAN-ODT) disintegrating tablet 4 mg    DISCONTD: piperacillin-tazobactam (ZOSYN) 3.375 g in dextrose 5 % 50 mL IVPB (mini-bag)     Order Specific Question:   Antimicrobial Indications     Answer:   Intra-Abdominal Infection    piperacillin-tazobactam (ZOSYN) 4.5 g in dextrose 5 % 100 mL IVPB (mini-bag)     Order Specific Question:   Antimicrobial Indications     Answer:   Intra-Abdominal Infection     CONSULTS:  IP CONSULT TO GENERAL SURGERY  IP CONSULT TO INTERNAL MEDICINE  IP CONSULT TO GENERAL SURGERY    FINAL IMPRESSION      1. Acute cholecystitis          DISPOSITION/PLAN   DISPOSITION Admitted 12/02/2020 04:51:20 AM      PATIENT REFERRED TO:  No follow-up provider specified.   DISCHARGE MEDICATIONS:  New Prescriptions    No medications on file     Damián Villatoro MD  Attending Emergency Physician                    Jasmin Samuels MD  12/02/20 9213

## 2020-12-02 NOTE — CARE COORDINATION
ER D/C plan reviewed. Scheduled for lap bogdan 12-3-20 at 3pm per Dr. Jannet Kaiser. Continue to follow post op.

## 2020-12-03 ENCOUNTER — ANESTHESIA (OUTPATIENT)
Dept: OPERATING ROOM | Age: 75
DRG: 419 | End: 2020-12-03
Payer: MEDICARE

## 2020-12-03 VITALS — SYSTOLIC BLOOD PRESSURE: 142 MMHG | DIASTOLIC BLOOD PRESSURE: 72 MMHG | TEMPERATURE: 96.8 F | OXYGEN SATURATION: 100 %

## 2020-12-03 LAB
ALBUMIN SERPL-MCNC: 4.3 G/DL (ref 3.5–5.2)
ALBUMIN/GLOBULIN RATIO: ABNORMAL (ref 1–2.5)
ALP BLD-CCNC: 66 U/L (ref 40–129)
ALT SERPL-CCNC: 32 U/L (ref 5–41)
AMYLASE: 45 U/L (ref 28–100)
ANION GAP SERPL CALCULATED.3IONS-SCNC: 7 MMOL/L (ref 9–17)
AST SERPL-CCNC: 18 U/L
BILIRUB SERPL-MCNC: 0.83 MG/DL (ref 0.3–1.2)
BUN BLDV-MCNC: 9 MG/DL (ref 8–23)
BUN/CREAT BLD: 9 (ref 9–20)
CALCIUM SERPL-MCNC: 9 MG/DL (ref 8.6–10.4)
CHLORIDE BLD-SCNC: 106 MMOL/L (ref 98–107)
CO2: 26 MMOL/L (ref 20–31)
CREAT SERPL-MCNC: 1.01 MG/DL (ref 0.7–1.2)
GFR AFRICAN AMERICAN: >60 ML/MIN
GFR NON-AFRICAN AMERICAN: >60 ML/MIN
GFR SERPL CREATININE-BSD FRML MDRD: ABNORMAL ML/MIN/{1.73_M2}
GFR SERPL CREATININE-BSD FRML MDRD: ABNORMAL ML/MIN/{1.73_M2}
GLUCOSE BLD-MCNC: 138 MG/DL (ref 70–99)
HCT VFR BLD CALC: 39.2 % (ref 40.7–50.3)
HEMOGLOBIN: 13.4 G/DL (ref 13–17)
LIPASE: 24 U/L (ref 13–60)
MAGNESIUM: 2.1 MG/DL (ref 1.6–2.6)
MCH RBC QN AUTO: 29.9 PG (ref 25.2–33.5)
MCHC RBC AUTO-ENTMCNC: 34.2 G/DL (ref 28.4–34.8)
MCV RBC AUTO: 87.5 FL (ref 82.6–102.9)
NRBC AUTOMATED: 0 PER 100 WBC
PDW BLD-RTO: 12.6 % (ref 11.8–14.4)
PHOSPHORUS: 2 MG/DL (ref 2.5–4.5)
PLATELET # BLD: 166 K/UL (ref 138–453)
PMV BLD AUTO: 9.8 FL (ref 8.1–13.5)
POTASSIUM SERPL-SCNC: 4.1 MMOL/L (ref 3.7–5.3)
RBC # BLD: 4.48 M/UL (ref 4.21–5.77)
SODIUM BLD-SCNC: 139 MMOL/L (ref 135–144)
TOTAL PROTEIN: 6.2 G/DL (ref 6.4–8.3)
WBC # BLD: 7.5 K/UL (ref 3.5–11.3)

## 2020-12-03 PROCEDURE — 80053 COMPREHEN METABOLIC PANEL: CPT

## 2020-12-03 PROCEDURE — 8E0W4CZ ROBOTIC ASSISTED PROCEDURE OF TRUNK REGION, PERCUTANEOUS ENDOSCOPIC APPROACH: ICD-10-PCS | Performed by: SURGERY

## 2020-12-03 PROCEDURE — 83690 ASSAY OF LIPASE: CPT

## 2020-12-03 PROCEDURE — 2709999900 HC NON-CHARGEABLE SUPPLY: Performed by: SURGERY

## 2020-12-03 PROCEDURE — 82150 ASSAY OF AMYLASE: CPT

## 2020-12-03 PROCEDURE — 6370000000 HC RX 637 (ALT 250 FOR IP): Performed by: STUDENT IN AN ORGANIZED HEALTH CARE EDUCATION/TRAINING PROGRAM

## 2020-12-03 PROCEDURE — 3600000009 HC SURGERY ROBOT BASE: Performed by: SURGERY

## 2020-12-03 PROCEDURE — 2580000003 HC RX 258: Performed by: NURSE PRACTITIONER

## 2020-12-03 PROCEDURE — 85027 COMPLETE CBC AUTOMATED: CPT

## 2020-12-03 PROCEDURE — 2500000003 HC RX 250 WO HCPCS: Performed by: NURSE PRACTITIONER

## 2020-12-03 PROCEDURE — 2580000003 HC RX 258: Performed by: NURSE ANESTHETIST, CERTIFIED REGISTERED

## 2020-12-03 PROCEDURE — 2500000003 HC RX 250 WO HCPCS: Performed by: SURGERY

## 2020-12-03 PROCEDURE — 2580000003 HC RX 258: Performed by: STUDENT IN AN ORGANIZED HEALTH CARE EDUCATION/TRAINING PROGRAM

## 2020-12-03 PROCEDURE — 3700000000 HC ANESTHESIA ATTENDED CARE: Performed by: SURGERY

## 2020-12-03 PROCEDURE — APPSS45 APP SPLIT SHARED TIME 31-45 MINUTES: Performed by: NURSE PRACTITIONER

## 2020-12-03 PROCEDURE — S2900 ROBOTIC SURGICAL SYSTEM: HCPCS | Performed by: SURGERY

## 2020-12-03 PROCEDURE — 99232 SBSQ HOSP IP/OBS MODERATE 35: CPT | Performed by: INTERNAL MEDICINE

## 2020-12-03 PROCEDURE — 3600000019 HC SURGERY ROBOT ADDTL 15MIN: Performed by: SURGERY

## 2020-12-03 PROCEDURE — 6370000000 HC RX 637 (ALT 250 FOR IP): Performed by: NURSE PRACTITIONER

## 2020-12-03 PROCEDURE — 6360000002 HC RX W HCPCS: Performed by: STUDENT IN AN ORGANIZED HEALTH CARE EDUCATION/TRAINING PROGRAM

## 2020-12-03 PROCEDURE — 36415 COLL VENOUS BLD VENIPUNCTURE: CPT

## 2020-12-03 PROCEDURE — 2500000003 HC RX 250 WO HCPCS: Performed by: STUDENT IN AN ORGANIZED HEALTH CARE EDUCATION/TRAINING PROGRAM

## 2020-12-03 PROCEDURE — 84100 ASSAY OF PHOSPHORUS: CPT

## 2020-12-03 PROCEDURE — 1200000000 HC SEMI PRIVATE

## 2020-12-03 PROCEDURE — 7100000001 HC PACU RECOVERY - ADDTL 15 MIN: Performed by: SURGERY

## 2020-12-03 PROCEDURE — 7100000000 HC PACU RECOVERY - FIRST 15 MIN: Performed by: SURGERY

## 2020-12-03 PROCEDURE — 83735 ASSAY OF MAGNESIUM: CPT

## 2020-12-03 PROCEDURE — 6360000002 HC RX W HCPCS: Performed by: NURSE PRACTITIONER

## 2020-12-03 PROCEDURE — 2780000010 HC IMPLANT OTHER: Performed by: SURGERY

## 2020-12-03 PROCEDURE — 0FT44ZZ RESECTION OF GALLBLADDER, PERCUTANEOUS ENDOSCOPIC APPROACH: ICD-10-PCS | Performed by: SURGERY

## 2020-12-03 PROCEDURE — 6360000002 HC RX W HCPCS: Performed by: NURSE ANESTHETIST, CERTIFIED REGISTERED

## 2020-12-03 PROCEDURE — 6360000002 HC RX W HCPCS: Performed by: INTERNAL MEDICINE

## 2020-12-03 PROCEDURE — 3700000001 HC ADD 15 MINUTES (ANESTHESIA): Performed by: SURGERY

## 2020-12-03 PROCEDURE — 2500000003 HC RX 250 WO HCPCS: Performed by: NURSE ANESTHETIST, CERTIFIED REGISTERED

## 2020-12-03 RX ORDER — DEXAMETHASONE SODIUM PHOSPHATE 10 MG/ML
INJECTION, SOLUTION INTRAMUSCULAR; INTRAVENOUS PRN
Status: DISCONTINUED | OUTPATIENT
Start: 2020-12-03 | End: 2020-12-03 | Stop reason: SDUPTHER

## 2020-12-03 RX ORDER — KETOROLAC TROMETHAMINE 30 MG/ML
INJECTION, SOLUTION INTRAMUSCULAR; INTRAVENOUS PRN
Status: DISCONTINUED | OUTPATIENT
Start: 2020-12-03 | End: 2020-12-03 | Stop reason: SDUPTHER

## 2020-12-03 RX ORDER — BUPIVACAINE HYDROCHLORIDE AND EPINEPHRINE 5; 5 MG/ML; UG/ML
INJECTION, SOLUTION EPIDURAL; INTRACAUDAL; PERINEURAL PRN
Status: DISCONTINUED | OUTPATIENT
Start: 2020-12-03 | End: 2020-12-03 | Stop reason: HOSPADM

## 2020-12-03 RX ORDER — LIDOCAINE HYDROCHLORIDE 20 MG/ML
INJECTION, SOLUTION EPIDURAL; INFILTRATION; INTRACAUDAL; PERINEURAL PRN
Status: DISCONTINUED | OUTPATIENT
Start: 2020-12-03 | End: 2020-12-03 | Stop reason: SDUPTHER

## 2020-12-03 RX ORDER — HYDROMORPHONE HCL 110MG/55ML
0.25 PATIENT CONTROLLED ANALGESIA SYRINGE INTRAVENOUS EVERY 5 MIN PRN
Status: DISCONTINUED | OUTPATIENT
Start: 2020-12-03 | End: 2020-12-03 | Stop reason: HOSPADM

## 2020-12-03 RX ORDER — INDOCYANINE GREEN AND WATER 25 MG
KIT INJECTION PRN
Status: DISCONTINUED | OUTPATIENT
Start: 2020-12-03 | End: 2020-12-03 | Stop reason: HOSPADM

## 2020-12-03 RX ORDER — METOPROLOL SUCCINATE 50 MG/1
50 TABLET, EXTENDED RELEASE ORAL DAILY
Status: DISCONTINUED | OUTPATIENT
Start: 2020-12-03 | End: 2020-12-04 | Stop reason: HOSPADM

## 2020-12-03 RX ORDER — HYDROCODONE BITARTRATE AND ACETAMINOPHEN 5; 325 MG/1; MG/1
1 TABLET ORAL EVERY 6 HOURS PRN
Qty: 30 TABLET | Refills: 0 | Status: SHIPPED | OUTPATIENT
Start: 2020-12-03 | End: 2020-12-10

## 2020-12-03 RX ORDER — SODIUM CHLORIDE, SODIUM LACTATE, POTASSIUM CHLORIDE, CALCIUM CHLORIDE 600; 310; 30; 20 MG/100ML; MG/100ML; MG/100ML; MG/100ML
INJECTION, SOLUTION INTRAVENOUS CONTINUOUS PRN
Status: DISCONTINUED | OUTPATIENT
Start: 2020-12-03 | End: 2020-12-03 | Stop reason: SDUPTHER

## 2020-12-03 RX ORDER — PROPOFOL 10 MG/ML
INJECTION, EMULSION INTRAVENOUS PRN
Status: DISCONTINUED | OUTPATIENT
Start: 2020-12-03 | End: 2020-12-03 | Stop reason: SDUPTHER

## 2020-12-03 RX ORDER — INDOCYANINE GREEN AND WATER 25 MG
5 KIT INJECTION ONCE
Status: DISCONTINUED | OUTPATIENT
Start: 2020-12-03 | End: 2020-12-03

## 2020-12-03 RX ORDER — METOPROLOL TARTRATE 5 MG/5ML
INJECTION INTRAVENOUS PRN
Status: DISCONTINUED | OUTPATIENT
Start: 2020-12-03 | End: 2020-12-03 | Stop reason: SDUPTHER

## 2020-12-03 RX ORDER — ONDANSETRON 2 MG/ML
4 INJECTION INTRAMUSCULAR; INTRAVENOUS
Status: DISCONTINUED | OUTPATIENT
Start: 2020-12-03 | End: 2020-12-03 | Stop reason: HOSPADM

## 2020-12-03 RX ORDER — FENTANYL CITRATE 50 UG/ML
25 INJECTION, SOLUTION INTRAMUSCULAR; INTRAVENOUS EVERY 5 MIN PRN
Status: DISCONTINUED | OUTPATIENT
Start: 2020-12-03 | End: 2020-12-03 | Stop reason: HOSPADM

## 2020-12-03 RX ORDER — DEXTROSE, SODIUM CHLORIDE, AND POTASSIUM CHLORIDE 5; .45; .15 G/100ML; G/100ML; G/100ML
INJECTION INTRAVENOUS CONTINUOUS
Status: DISCONTINUED | OUTPATIENT
Start: 2020-12-03 | End: 2020-12-04 | Stop reason: HOSPADM

## 2020-12-03 RX ORDER — CEFAZOLIN SODIUM 1 G/3ML
INJECTION, POWDER, FOR SOLUTION INTRAMUSCULAR; INTRAVENOUS PRN
Status: DISCONTINUED | OUTPATIENT
Start: 2020-12-03 | End: 2020-12-03 | Stop reason: SDUPTHER

## 2020-12-03 RX ORDER — ONDANSETRON 2 MG/ML
INJECTION INTRAMUSCULAR; INTRAVENOUS PRN
Status: DISCONTINUED | OUTPATIENT
Start: 2020-12-03 | End: 2020-12-03 | Stop reason: SDUPTHER

## 2020-12-03 RX ORDER — HYDROCODONE BITARTRATE AND ACETAMINOPHEN 5; 325 MG/1; MG/1
1 TABLET ORAL EVERY 4 HOURS PRN
Status: DISCONTINUED | OUTPATIENT
Start: 2020-12-03 | End: 2020-12-04 | Stop reason: HOSPADM

## 2020-12-03 RX ORDER — FENTANYL CITRATE 50 UG/ML
50 INJECTION, SOLUTION INTRAMUSCULAR; INTRAVENOUS
Status: DISCONTINUED | OUTPATIENT
Start: 2020-12-03 | End: 2020-12-03

## 2020-12-03 RX ORDER — ROCURONIUM BROMIDE 10 MG/ML
INJECTION, SOLUTION INTRAVENOUS PRN
Status: DISCONTINUED | OUTPATIENT
Start: 2020-12-03 | End: 2020-12-03 | Stop reason: SDUPTHER

## 2020-12-03 RX ORDER — FENTANYL CITRATE 50 UG/ML
INJECTION, SOLUTION INTRAMUSCULAR; INTRAVENOUS PRN
Status: DISCONTINUED | OUTPATIENT
Start: 2020-12-03 | End: 2020-12-03 | Stop reason: SDUPTHER

## 2020-12-03 RX ORDER — ONDANSETRON 4 MG/1
4 TABLET, FILM COATED ORAL EVERY 8 HOURS PRN
Qty: 30 TABLET | Refills: 0 | Status: SHIPPED | OUTPATIENT
Start: 2020-12-03 | End: 2020-12-07

## 2020-12-03 RX ORDER — DOCUSATE SODIUM 100 MG/1
100 CAPSULE, LIQUID FILLED ORAL 2 TIMES DAILY PRN
Qty: 60 CAPSULE | Refills: 0 | Status: SHIPPED | OUTPATIENT
Start: 2020-12-03 | End: 2022-01-27

## 2020-12-03 RX ADMIN — DEXAMETHASONE SODIUM PHOSPHATE 10 MG: 10 INJECTION INTRAMUSCULAR; INTRAVENOUS at 15:31

## 2020-12-03 RX ADMIN — PIPERACILLIN AND TAZOBACTAM 3.38 G: 3; .375 INJECTION, POWDER, LYOPHILIZED, FOR SOLUTION INTRAVENOUS at 03:20

## 2020-12-03 RX ADMIN — ONDANSETRON 4 MG: 2 INJECTION, SOLUTION INTRAMUSCULAR; INTRAVENOUS at 15:31

## 2020-12-03 RX ADMIN — ROCURONIUM BROMIDE 50 MG: 10 INJECTION, SOLUTION INTRAVENOUS at 15:22

## 2020-12-03 RX ADMIN — HYDRALAZINE HYDROCHLORIDE 10 MG: 20 INJECTION, SOLUTION INTRAMUSCULAR; INTRAVENOUS at 18:49

## 2020-12-03 RX ADMIN — POTASSIUM CHLORIDE, DEXTROSE MONOHYDRATE AND SODIUM CHLORIDE: 150; 5; 450 INJECTION, SOLUTION INTRAVENOUS at 18:33

## 2020-12-03 RX ADMIN — SODIUM CHLORIDE, POTASSIUM CHLORIDE, SODIUM LACTATE AND CALCIUM CHLORIDE: 600; 310; 30; 20 INJECTION, SOLUTION INTRAVENOUS at 16:05

## 2020-12-03 RX ADMIN — ACETAMINOPHEN 650 MG: 325 TABLET ORAL at 09:09

## 2020-12-03 RX ADMIN — HYDRALAZINE HYDROCHLORIDE 10 MG: 20 INJECTION, SOLUTION INTRAMUSCULAR; INTRAVENOUS at 09:00

## 2020-12-03 RX ADMIN — Medication 50 MCG: at 15:30

## 2020-12-03 RX ADMIN — LATANOPROST 1 DROP: 50 SOLUTION OPHTHALMIC at 20:53

## 2020-12-03 RX ADMIN — PIPERACILLIN AND TAZOBACTAM 3.38 G: 3; .375 INJECTION, POWDER, LYOPHILIZED, FOR SOLUTION INTRAVENOUS at 18:31

## 2020-12-03 RX ADMIN — PIPERACILLIN AND TAZOBACTAM 3.38 G: 3; .375 INJECTION, POWDER, LYOPHILIZED, FOR SOLUTION INTRAVENOUS at 12:39

## 2020-12-03 RX ADMIN — METOPROLOL TARTRATE 5 MG: 1 INJECTION, SOLUTION INTRAVENOUS at 16:05

## 2020-12-03 RX ADMIN — Medication 50 MCG: at 15:22

## 2020-12-03 RX ADMIN — PROPOFOL 100 MG: 10 INJECTION, EMULSION INTRAVENOUS at 16:16

## 2020-12-03 RX ADMIN — TIMOLOL MALEATE 1 DROP: 5 SOLUTION/ DROPS OPHTHALMIC at 08:56

## 2020-12-03 RX ADMIN — SUGAMMADEX 200 MG: 100 INJECTION, SOLUTION INTRAVENOUS at 16:25

## 2020-12-03 RX ADMIN — SODIUM CHLORIDE, POTASSIUM CHLORIDE, SODIUM LACTATE AND CALCIUM CHLORIDE: 600; 310; 30; 20 INJECTION, SOLUTION INTRAVENOUS at 15:19

## 2020-12-03 RX ADMIN — HYDROCODONE BITARTRATE AND ACETAMINOPHEN 1 TABLET: 5; 325 TABLET ORAL at 20:55

## 2020-12-03 RX ADMIN — TIMOLOL MALEATE 1 DROP: 5 SOLUTION/ DROPS OPHTHALMIC at 20:53

## 2020-12-03 RX ADMIN — CEFAZOLIN 2000 MG: 1 INJECTION, POWDER, FOR SOLUTION INTRAMUSCULAR; INTRAVENOUS at 15:34

## 2020-12-03 RX ADMIN — Medication 50 MCG: at 16:00

## 2020-12-03 RX ADMIN — ROCURONIUM BROMIDE 30 MG: 10 INJECTION, SOLUTION INTRAVENOUS at 15:55

## 2020-12-03 RX ADMIN — Medication 50 MCG: at 15:44

## 2020-12-03 RX ADMIN — PROPOFOL 150 MG: 10 INJECTION, EMULSION INTRAVENOUS at 15:22

## 2020-12-03 RX ADMIN — FAMOTIDINE 20 MG: 10 INJECTION INTRAVENOUS at 20:54

## 2020-12-03 RX ADMIN — Medication 10 ML: at 20:55

## 2020-12-03 RX ADMIN — FAMOTIDINE 20 MG: 10 INJECTION INTRAVENOUS at 08:56

## 2020-12-03 RX ADMIN — KETOROLAC TROMETHAMINE 15 MG: 30 INJECTION, SOLUTION INTRAMUSCULAR; INTRAVENOUS at 16:20

## 2020-12-03 RX ADMIN — PROPOFOL 50 MG: 10 INJECTION, EMULSION INTRAVENOUS at 15:55

## 2020-12-03 RX ADMIN — LIDOCAINE HYDROCHLORIDE 100 MG: 20 INJECTION, SOLUTION EPIDURAL; INFILTRATION; INTRACAUDAL; PERINEURAL at 15:22

## 2020-12-03 ASSESSMENT — PULMONARY FUNCTION TESTS
PIF_VALUE: 18
PIF_VALUE: 22
PIF_VALUE: 21
PIF_VALUE: 25
PIF_VALUE: 24
PIF_VALUE: 18
PIF_VALUE: 21
PIF_VALUE: 1
PIF_VALUE: 16
PIF_VALUE: 15
PIF_VALUE: 17
PIF_VALUE: 24
PIF_VALUE: 1
PIF_VALUE: 24
PIF_VALUE: 3
PIF_VALUE: 3
PIF_VALUE: 12
PIF_VALUE: 24
PIF_VALUE: 12
PIF_VALUE: 18
PIF_VALUE: 14
PIF_VALUE: 20
PIF_VALUE: 24
PIF_VALUE: 14
PIF_VALUE: 1
PIF_VALUE: 22
PIF_VALUE: 23
PIF_VALUE: 18
PIF_VALUE: 12
PIF_VALUE: 25
PIF_VALUE: 12
PIF_VALUE: 29
PIF_VALUE: 3
PIF_VALUE: 24
PIF_VALUE: 12
PIF_VALUE: 14
PIF_VALUE: 22
PIF_VALUE: 14
PIF_VALUE: 23
PIF_VALUE: 21
PIF_VALUE: 24
PIF_VALUE: 12
PIF_VALUE: 19
PIF_VALUE: 12
PIF_VALUE: 16
PIF_VALUE: 24
PIF_VALUE: 14
PIF_VALUE: 18
PIF_VALUE: 24
PIF_VALUE: 13
PIF_VALUE: 23
PIF_VALUE: -13
PIF_VALUE: 24
PIF_VALUE: 12
PIF_VALUE: 25
PIF_VALUE: 1
PIF_VALUE: 19
PIF_VALUE: 24
PIF_VALUE: 25
PIF_VALUE: 14
PIF_VALUE: 23
PIF_VALUE: 14
PIF_VALUE: 20
PIF_VALUE: 24
PIF_VALUE: 23
PIF_VALUE: 24
PIF_VALUE: 24
PIF_VALUE: 18
PIF_VALUE: 23
PIF_VALUE: 24
PIF_VALUE: 14
PIF_VALUE: 23
PIF_VALUE: 24
PIF_VALUE: 14
PIF_VALUE: 14
PIF_VALUE: 23
PIF_VALUE: 14
PIF_VALUE: 23
PIF_VALUE: 18
PIF_VALUE: 1

## 2020-12-03 ASSESSMENT — PAIN DESCRIPTION - DESCRIPTORS: DESCRIPTORS: ACHING

## 2020-12-03 ASSESSMENT — PAIN DESCRIPTION - PROGRESSION: CLINICAL_PROGRESSION: GRADUALLY WORSENING

## 2020-12-03 ASSESSMENT — PAIN SCALES - GENERAL
PAINLEVEL_OUTOF10: 0
PAINLEVEL_OUTOF10: 5
PAINLEVEL_OUTOF10: 0
PAINLEVEL_OUTOF10: 4

## 2020-12-03 ASSESSMENT — PAIN DESCRIPTION - LOCATION: LOCATION: ABDOMEN

## 2020-12-03 ASSESSMENT — PAIN DESCRIPTION - PAIN TYPE: TYPE: SURGICAL PAIN

## 2020-12-03 ASSESSMENT — PAIN DESCRIPTION - FREQUENCY: FREQUENCY: CONTINUOUS

## 2020-12-03 ASSESSMENT — PAIN DESCRIPTION - ONSET: ONSET: ON-GOING

## 2020-12-03 NOTE — PROGRESS NOTES
[facility]  Office: [address], DO, Sukhjinder Dastalia, DO, Hectorbrandon Carr, DO, Melony Mercer Blood, DO, Tierney Traore MD, Robert Mariee MD, Nancy Baugh MD, Sergei Shell MD, Sharyn Mendiola MD, Gladis Dinreo MD, Estelle Baumann MD, Antoinette Pozo MD, Yue Rubalcava MD, Woo Avitia, DO, Lio Ramsey MD, Aurora Barnard MD, Sharan Colindres, DO, Adrian Reagan MD,  Lc Brito, DO, Nicolasa Bae MD, Kyle Dey MD, Wm Lopez, [facility], [facility], CNP, Brit Levy, CNP, Paul Lipscomb, CNS, Marysol Jaime, CNP, Deonna Lara, CNP, Ivonne Marie, CNP, Agustín Larose, CNP, Angela Elizondo, CNP, Tio Barrios PA-C, Micah Lackey, [facility], Will Gracia, CNP, Lara Wright, CNP, Guzman Decker, CNP, Pauline Young, CNP, Miguel Mutton, [facility]    Progress Note    12/3/2020    9:12 AM    Name:   Elisha Avalos  MRN:     [id number]     Acct:      [de-identified]   Room:   210/[facility] Day:  1  Admit Date:  12/2/2020  1:48 AM    PCP:   Rene Clayton MD  Code Status:  Full Code    Subjective:     C/C:   Chief Complaint   Patient presents with    Abdominal Pain     Interval History Status: improved  Patient c/o of sinus headache but otherwise no complaints or acute events overnight. he remains hypertensive despite home cardiac meds and PRNs, vitals otherwise stable. NPO for surgery this afternoon   Brief History:   Elisha Avalos is a 70 y.o.  male who presents with Abdominal Pain and is admitted to the hospital for the management of Acute cholecystitis. Patient states he started having generalized abdominal pain associated with nausea and vomiting that started at 10 pm last night and got progressively worse. He has never had any similar previous episodes. CT abd/pelvis consistent with acute cholecystitis.  His nausea and vomiting have since resolved  Past Medical History:         Review of Systems: Constitutional:  negative for chills, fevers, sweats  Respiratory:  negative for cough, dyspnea on exertion, shortness of breath, wheezing  Cardiovascular:  negative for chest pain, chest pressure/discomfort, lower extremity edema, palpitations  Gastrointestinal:  negative for abdominal pain, constipation, diarrhea, nausea, vomiting  Neurological:  negative for dizziness, positive for  headache    Medications: Allergies:  No Known Allergies    Current Meds:   Scheduled Meds:    sodium chloride flush  10 mL Intravenous 2 times per day    famotidine (PEPCID) injection  20 mg Intravenous BID    [Held by provider] enoxaparin  40 mg Subcutaneous Daily    piperacillin-tazobactam  3.375 g Intravenous Q8H    metoprolol tartrate  25 mg Oral Daily    latanoprost  1 drop Both Eyes Nightly    timolol  1 drop Both Eyes BID     Continuous Infusions:    dextrose 5% and 0.45% NaCl with KCl 20 mEq 125 mL/hr at 20 1713     PRN Meds: fentanNYL, sodium chloride flush, potassium chloride **OR** potassium alternative oral replacement **OR** potassium chloride, magnesium sulfate, acetaminophen **OR** acetaminophen, polyethylene glycol, nicotine, ondansetron **OR** ondansetron, hydrALAZINE    Data:     Past Medical History:   has a past medical history of Coronary artery disease, GERD without esophagitis, and Hypertension. Social History:   reports that he has never smoked. He has never used smokeless tobacco. He reports that he does not drink alcohol. Family History:   Family History   Problem Relation Age of Onset    Cancer Mother     Cirrhosis Father        Vitals:  BP (!) 168/78   Pulse 95   Temp 97.9 °F (36.6 °C) (Oral)   Resp 19   Ht 5' 10\" (1.778 m)   Wt 187 lb (84.8 kg)   SpO2 99%   BMI 26.83 kg/m²   Temp (24hrs), Av.8 °F (36.6 °C), Min:97 °F (36.1 °C), Max:98.7 °F (37.1 °C)    No results for input(s): POCGLU in the last 72 hours. I/O (24Hr):   No intake or output data in the 24 hours ending 12/03/20 0912    Labs:  Hematology:  Recent Labs     12/02/20 0238 12/03/20  0558   WBC 10.5 7.5   RBC 5.07 4.48   HGB 15.0 13.4   HCT 43.2 39.2*   MCV 85.2 87.5   MCH 29.6 29.9   MCHC 34.7 34.2   RDW 12.7 12.6    166   MPV 10.3 9.8     Chemistry:  Recent Labs     12/02/20 0238 12/03/20  0558    139   K 3.9 4.1    106   CO2 25 26   GLUCOSE 163* 138*   BUN 18 9   CREATININE 0.89 1.01   MG  --  2.1   ANIONGAP 11 7*   LABGLOM >60 >60   GFRAA >60 >60   CALCIUM 9.7 9.0   PHOS  --  2.0*   TROPHS 7  --      Recent Labs     12/02/20 0238 12/03/20  0558   PROT 7.2 6.2*   LABALBU 5.0 4.3   AST 19 18   ALT 22 32   ALKPHOS 83 66   BILITOT 0.48 0.83   AMYLASE  --  45   LIPASE 35 24     ABG:No results found for: POCPH, PHART, PH, POCPCO2, HEJ8LMC, PCO2, POCPO2, PO2ART, PO2, POCHCO3, IZY9QEE, HCO3, NBEA, PBEA, BEART, BE, THGBART, THB, XPI0DTA, IDIQ8GOK, G7RPVWAE, O2SAT, FIO2  Lab Results   Component Value Date/Time    SPECIAL LT AC 10ML 12/02/2020 04:50 AM     Lab Results   Component Value Date/Time    CULTURE NO GROWTH 15 HOURS 12/02/2020 04:50 AM       Radiology:  Ct Abdomen Pelvis W Iv Contrast Additional Contrast? None    Result Date: 12/2/2020  1. Distended gallbladder with a 5 mm intraluminal stone near the gallbladder neck. No pericholecystic fluid. Gallbladder ultrasound may be helpful for further evaluation, especially if there is high clinical concern for acute cholecystitis. 2.  No bowel obstruction. Normal appendix. 3.  Colonic diverticulosis without acute diverticulitis. 4.  Mild haziness about the urinary bladder could relate to underlying cystitis. No obstructive uropathy. Us Gallbladder Ruq    Result Date: 12/2/2020  Negative right upper quadrant ultrasound. RECOMMENDATIONS: If there is continued clinical concern for cholecystitis then consider further assessment with HIDA scan.        Physical Examination:        General appearance:  alert, cooperative and no distress  Mental Status:  oriented to person, place and time and normal affect  Lungs:  clear to auscultation bilaterally, normal effort  Heart:  regular rate and rhythm, no murmur  Abdomen:  soft, mild tenderness to palpation, nondistended, normal bowel sounds, no masses, hepatomegaly, splenomegaly  Extremities:  no edema, redness, tenderness in the calves  Skin:  no gross lesions, rashes, induration    Assessment:        Hospital Problems           Last Modified POA    * (Principal) Acute cholecystitis 12/2/2020 Yes    Essential hypertension 12/2/2020 Yes    Vitamin D deficiency 12/2/2020 Yes    GERD without esophagitis 12/2/2020 Yes    Elevated blood pressure reading 12/2/2020 Yes    Coronary artery disease 12/3/2020 Yes          Plan:      1. Continue IV fluids for hydration  2. Keep NPO for surgery  3. Hold lovenox   4. Monitor and control blood pressure, currently suboptimally controlled,patient would like to take medication once a day if possible, discontinue lopressor 25mg daily, start toprol 50mg daily   5. Pain control, add fentanyl PRN while NPO   6. Activity as tolerated  7. Monitor labs, replace electrolytes as needed   8. Discharge likely tomorrow if he is able to tolerate diet after surgery   9.  Plan discussed with patient and staff     KULDEEP ZAMARRIPA NP  12/3/2020  9:12 AM

## 2020-12-03 NOTE — PROGRESS NOTES
cholecystitis [K81.0] 12/02/2020    GERD without esophagitis [K21.9]     Elevated blood pressure reading [R03.0]     Vitamin D deficiency [E55.9] 05/03/2016    Essential hypertension [I10] 10/14/2015       70 y.o. male with acute cholecystitis    Plan:  1. NPO  2. IVF hydration  3. Pain control  4. Zosyn  5. lovenox held  6. AM labs pending  7. Plan for OR today for robotic assisted cholecystectomy, consent obtained and in chart    Electronically signed by Jonas Lefort, DO  on 12/3/2020 at 5:26 AM    Attending Physician Statement  I have discussed the case, including pertinent history and exam findings with the resident. I agree with the assessment, plan and orders as documented by the resident. Patient seen and examined. Agree with above. To OR for robotic cholecystectomy.

## 2020-12-03 NOTE — ANESTHESIA PRE PROCEDURE
Department of Anesthesiology  Preprocedure Note       Name:  Christin Blackburn   Age:  70 y.o.  :  1945                                          MRN:  [id number]         Date:  12/3/2020      Surgeon: Sandeep Plascencia): Shankar Gonzalez MD    Procedure: Procedure(s):  CHOLECYSTECTOMY LAPAROSCOPIC ROBOTIC XI    Medications prior to admission:   Prior to Admission medications    Medication Sig Start Date End Date Taking? Authorizing Provider   HYDROcodone-acetaminophen (NORCO) 5-325 MG per tablet Take 1 tablet by mouth every 6 hours as needed for Pain for up to 7 days. Intended supply: 7 days.  Take lowest dose possible to manage pain 12/3/20 12/10/20 Yes Ron Cochran DO   ondansetron [facility]) 4 MG tablet Take 1 tablet by mouth every 8 hours as needed for Nausea or Vomiting 12/3/20  Yes Ron Cochran DO   docusate sodium (COLACE) 100 MG capsule Take 1 capsule by mouth 2 times daily as needed for Constipation 12/3/20  Yes Ron Cochran DO   timolol (TIMOPTIC-XE) 0.5 % ophthalmic gel-forming Place 1 drop into both eyes daily   Yes Historical Provider, MD   vitamin D (CHOLECALCIFEROL) 25 MCG (1000 UT) TABS tablet Take 1,000 Units by mouth daily   Yes Historical Provider, MD   Multiple Vitamins-Minerals (CENTRUM SILVER) TABS Take 1 tablet by mouth daily   Yes Historical Provider, MD   metoprolol tartrate (LOPRESSOR) 25 MG tablet TAKE 1 TABLET DAILY 10/9/20  Yes Isabella Daniel MD   latanoprost (XALATAN) 0.005 % ophthalmic solution Place 1 drop into both eyes nightly  19  Yes Historical Provider, MD   aspirin 81 MG tablet Take 81 mg by mouth daily   Yes Historical Provider, MD       Current medications:    Current Facility-Administered Medications   Medication Dose Route Frequency Provider Last Rate Last Dose    [MAR Hold] fentaNYL (SUBLIMAZE) injection 50 mcg  50 mcg Intravenous Q2H PRN KULDEEP Mon - KURT        [facility] Hold] metoprolol succinate (TOPROL XL) extended release tablet 50 mg  50 mg Oral Daily KULDEEP Mon NP Pretty Hold] indocyanine green (IC-GREEN) syringe 5 mg  5 mg Intravenous Once Claudia Dumont DO        indocyanine green (IC-GREEN) syringe    PRN Alison Marinelli MD   7.5 mg at 12/03/20 1519    bupivacaine-EPINEPHrine PF (MARCAINE-w/EPINEPHRINE) 0.5% -1:855020 injection    PRN Alison Marinelli MD   30 mL at 12/03/20 1603    [MAR Hold] dextrose 5 % and 0.45 % NaCl with KCl 20 mEq infusion   Intravenous Continuous KULDEEP Soriano  mL/hr at 12/02/20 1713      [MAR Hold] sodium chloride flush 0.9 % injection 10 mL  10 mL Intravenous 2 times per day KULDEEP Soriano CNP        [MAR Hold] sodium chloride flush 0.9 % injection 10 mL  10 mL Intravenous PRN KULDEEP Soriano CNP        [MAR Hold] potassium chloride (KLOR-CON M) extended release tablet 40 mEq  40 mEq Oral PRN KULDEEP Soriano CNP        Or    [MAR Hold] potassium bicarb-citric acid (EFFER-K) effervescent tablet 40 mEq  40 mEq Oral PRN KULDEEP Soriano CNP        Or    [MAR Hold] potassium chloride 10 mEq/100 mL IVPB (Peripheral Line)  10 mEq Intravenous PRN KULDEEP Soriano CNP        [MAR Hold] magnesium sulfate 1 g in dextrose 5% 100 mL IVPB  1 g Intravenous PRN KULDEEP Soriano CNP        [MAR Hold] acetaminophen (TYLENOL) tablet 650 mg  650 mg Oral Q6H PRN KULDEEP Soriano CNP   650 mg at 12/03/20 0909    Or    [MAR Hold] acetaminophen (TYLENOL) suppository 650 mg  650 mg Rectal Q6H PRN KULDEEP Soriano CNP        [MAR Hold] polyethylene glycol (GLYCOLAX) packet 17 g  17 g Oral Daily PRN KULDEEP Soriano CNP        [MAR Hold] famotidine (PEPCID) injection 20 mg  20 mg Intravenous BID KULDEEP Soriano CNP   20 mg at 12/03/20 0856    [MAR Hold] nicotine (NICODERM CQ) 21 MG/24HR 1 patch  1 patch Transdermal Daily PRN Danielle Max, APRN - CNP        [Held by provider] enoxaparin 12/02/20 2357 12/03/20 0403 12/03/20 0645 12/03/20 1644   BP: 139/68 (!) 144/63 (!) 168/78 (!) 158/87   Pulse: 95 92 95 87   Resp: 15 15 19 20   Temp: 97.7 °F (36.5 °C) 97.9 °F (36.6 °C) 97.9 °F (36.6 °C) 97.2 °F (36.2 °C)   TempSrc: Oral Oral Oral Temporal   SpO2: 98% 99%  96%   Weight:       Height:                                                  BP Readings from Last 3 Encounters:   12/03/20 (!) 158/87   06/11/20 128/72   10/14/19 130/72       NPO Status: Time of last liquid consumption: 1700                        Time of last solid consumption: 1700                        Date of last liquid consumption: 12/02/20                        Date of last solid food consumption: 12/02/20    BMI:   Wt Readings from Last 3 Encounters:   12/02/20 187 lb (84.8 kg)   06/11/20 183 lb (83 kg)   10/14/19 178 lb (80.7 kg)     Body mass index is 26.83 kg/m². CBC:   Lab Results   Component Value Date    WBC 7.5 12/03/2020    RBC 4.48 12/03/2020    HGB 13.4 12/03/2020    HCT 39.2 12/03/2020    MCV 87.5 12/03/2020    RDW 12.6 12/03/2020     12/03/2020       CMP:   Lab Results   Component Value Date     12/03/2020    K 4.1 12/03/2020     12/03/2020    CO2 26 12/03/2020    BUN 9 12/03/2020    CREATININE 1.01 12/03/2020    GFRAA >60 12/03/2020    LABGLOM >60 12/03/2020    GLUCOSE 138 12/03/2020    PROT 6.2 12/03/2020    CALCIUM 9.0 12/03/2020    BILITOT 0.83 12/03/2020    ALKPHOS 66 12/03/2020    AST 18 12/03/2020    ALT 32 12/03/2020       POC Tests: No results for input(s): POCGLU, POCNA, POCK, POCCL, POCBUN, POCHEMO, POCHCT in the last 72 hours.     Coags: No results found for: PROTIME, INR, APTT    HCG (If Applicable): No results found for: PREGTESTUR, PREGSERUM, HCG, HCGQUANT     ABGs: No results found for: PHART, PO2ART, NVW6JPC, LYT8VLQ, BEART, [id number]     Type & Screen (If Applicable):  No results found for: LABABO, LABRH    Drug/Infectious Status (If Applicable):  No results found for: HIV, HEPCAB    COVID-19 Screening (If Applicable):   Lab Results   Component Value Date    COVID19 Not Detected 12/02/2020         Anesthesia Evaluation   no history of anesthetic complications:   Airway: Mallampati: II  TM distance: >3 FB     Mouth opening: > = 3 FB Dental:          Pulmonary:Negative Pulmonary ROS and normal exam                               Cardiovascular:  Exercise tolerance: good (>4 METS),   (+) hypertension:, CAD:,     (-)  angina                Neuro/Psych:   Negative Neuro/Psych ROS              GI/Hepatic/Renal:   (+) GERD: well controlled,           Endo/Other: Negative Endo/Other ROS                    Abdominal:           Vascular:                                        Anesthesia Plan      general     ASA 3       Induction: intravenous. MIPS: Postoperative opioids intended and Prophylactic antiemetics administered. Anesthetic plan and risks discussed with patient. Plan discussed with CRNA.     Attending anesthesiologist reviewed and agrees with Danny Barth MD   12/3/2020

## 2020-12-03 NOTE — ANESTHESIA POSTPROCEDURE EVALUATION
Department of Anesthesiology  Postprocedure Note    Patient: Martin Mejia  MRN: [id number]  YOB: 1945  Date of evaluation: 12/3/2020  Time:  6:04 PM     Procedure Summary     Date:  12/03/20 Room / Location:  [address] [place] [address] / [address] [place] [address]    Anesthesia Start:  9101 Anesthesia Stop:  1648    Procedure:  CHOLECYSTECTOMY LAPAROSCOPIC ROBOTIC XI (N/A Abdomen) Diagnosis:  (CHOLECYSTITIS)    Surgeon:  Pati Acevedo MD Responsible Provider:  Adi Long MD    Anesthesia Type:  general ASA Status:  3          Anesthesia Type: general    Emy Phase I: Emy Score: 4    Emy Phase II:      Last vitals: Reviewed and per EMR flowsheets.        Anesthesia Post Evaluation    Patient location during evaluation: PACU  Patient participation: complete - patient participated  Level of consciousness: awake  Airway patency: patent  Nausea & Vomiting: no nausea  Complications: no  Cardiovascular status: blood pressure returned to baseline  Respiratory status: acceptable  Hydration status: euvolemic

## 2020-12-04 VITALS
HEIGHT: 70 IN | TEMPERATURE: 97.6 F | OXYGEN SATURATION: 95 % | RESPIRATION RATE: 16 BRPM | BODY MASS INDEX: 26.77 KG/M2 | HEART RATE: 98 BPM | WEIGHT: 187 LBS | DIASTOLIC BLOOD PRESSURE: 48 MMHG | SYSTOLIC BLOOD PRESSURE: 164 MMHG

## 2020-12-04 LAB
ABSOLUTE EOS #: 0.19 K/UL (ref 0–0.44)
ABSOLUTE IMMATURE GRANULOCYTE: 0.19 K/UL (ref 0–0.3)
ABSOLUTE LYMPH #: 0.57 K/UL (ref 1.1–3.7)
ABSOLUTE MONO #: 1.14 K/UL (ref 0.1–1.2)
BASOPHILS # BLD: 0 % (ref 0–2)
BASOPHILS ABSOLUTE: 0 K/UL (ref 0–0.2)
DIFFERENTIAL TYPE: ABNORMAL
EOSINOPHILS RELATIVE PERCENT: 1 % (ref 1–4)
HCT VFR BLD CALC: 40.6 % (ref 40.7–50.3)
HEMOGLOBIN: 13.8 G/DL (ref 13–17)
IMMATURE GRANULOCYTES: 1 %
LYMPHOCYTES # BLD: 3 % (ref 24–43)
MCH RBC QN AUTO: 29.2 PG (ref 25.2–33.5)
MCHC RBC AUTO-ENTMCNC: 34 G/DL (ref 28.4–34.8)
MCV RBC AUTO: 86 FL (ref 82.6–102.9)
MONOCYTES # BLD: 6 % (ref 3–12)
NRBC AUTOMATED: 0 PER 100 WBC
PDW BLD-RTO: 12.4 % (ref 11.8–14.4)
PLATELET # BLD: 211 K/UL (ref 138–453)
PLATELET ESTIMATE: ABNORMAL
PMV BLD AUTO: 10.2 FL (ref 8.1–13.5)
RBC # BLD: 4.72 M/UL (ref 4.21–5.77)
RBC # BLD: ABNORMAL 10*6/UL
SEG NEUTROPHILS: 89 % (ref 36–65)
SEGMENTED NEUTROPHILS ABSOLUTE COUNT: 16.91 K/UL (ref 1.5–8.1)
WBC # BLD: 19 K/UL (ref 3.5–11.3)
WBC # BLD: ABNORMAL 10*3/UL

## 2020-12-04 PROCEDURE — 2500000003 HC RX 250 WO HCPCS: Performed by: STUDENT IN AN ORGANIZED HEALTH CARE EDUCATION/TRAINING PROGRAM

## 2020-12-04 PROCEDURE — 6370000000 HC RX 637 (ALT 250 FOR IP): Performed by: STUDENT IN AN ORGANIZED HEALTH CARE EDUCATION/TRAINING PROGRAM

## 2020-12-04 PROCEDURE — 6360000002 HC RX W HCPCS: Performed by: STUDENT IN AN ORGANIZED HEALTH CARE EDUCATION/TRAINING PROGRAM

## 2020-12-04 PROCEDURE — 2580000003 HC RX 258: Performed by: STUDENT IN AN ORGANIZED HEALTH CARE EDUCATION/TRAINING PROGRAM

## 2020-12-04 PROCEDURE — 85025 COMPLETE CBC W/AUTO DIFF WBC: CPT

## 2020-12-04 PROCEDURE — 88304 TISSUE EXAM BY PATHOLOGIST: CPT

## 2020-12-04 PROCEDURE — 99238 HOSP IP/OBS DSCHRG MGMT 30/<: CPT | Performed by: INTERNAL MEDICINE

## 2020-12-04 PROCEDURE — 36415 COLL VENOUS BLD VENIPUNCTURE: CPT

## 2020-12-04 RX ORDER — PANTOPRAZOLE SODIUM 40 MG/1
40 TABLET, DELAYED RELEASE ORAL
Qty: 30 TABLET | Refills: 1 | Status: SHIPPED | OUTPATIENT
Start: 2020-12-04 | End: 2020-12-07

## 2020-12-04 RX ADMIN — TIMOLOL MALEATE 1 DROP: 5 SOLUTION/ DROPS OPHTHALMIC at 08:34

## 2020-12-04 RX ADMIN — PIPERACILLIN AND TAZOBACTAM 3.38 G: 3; .375 INJECTION, POWDER, LYOPHILIZED, FOR SOLUTION INTRAVENOUS at 02:31

## 2020-12-04 RX ADMIN — METOPROLOL SUCCINATE 50 MG: 50 TABLET, EXTENDED RELEASE ORAL at 08:34

## 2020-12-04 RX ADMIN — POTASSIUM CHLORIDE, DEXTROSE MONOHYDRATE AND SODIUM CHLORIDE: 150; 5; 450 INJECTION, SOLUTION INTRAVENOUS at 02:31

## 2020-12-04 RX ADMIN — ENOXAPARIN SODIUM 40 MG: 40 INJECTION SUBCUTANEOUS at 08:34

## 2020-12-04 RX ADMIN — FAMOTIDINE 20 MG: 10 INJECTION INTRAVENOUS at 08:34

## 2020-12-04 RX ADMIN — Medication 10 ML: at 08:34

## 2020-12-04 ASSESSMENT — PAIN SCALES - GENERAL: PAINLEVEL_OUTOF10: 2

## 2020-12-04 ASSESSMENT — PAIN DESCRIPTION - PAIN TYPE: TYPE: SURGICAL PAIN

## 2020-12-04 ASSESSMENT — PAIN DESCRIPTION - PROGRESSION: CLINICAL_PROGRESSION: GRADUALLY IMPROVING

## 2020-12-04 ASSESSMENT — ENCOUNTER SYMPTOMS
BACK PAIN: 1
COUGH: 0
SHORTNESS OF BREATH: 0
ABDOMINAL PAIN: 1
VOMITING: 0
NAUSEA: 0

## 2020-12-04 ASSESSMENT — PAIN DESCRIPTION - FREQUENCY: FREQUENCY: CONTINUOUS

## 2020-12-04 ASSESSMENT — PAIN DESCRIPTION - DESCRIPTORS: DESCRIPTORS: ACHING

## 2020-12-04 ASSESSMENT — PAIN DESCRIPTION - LOCATION: LOCATION: ABDOMEN

## 2020-12-04 NOTE — PROGRESS NOTES
General Surgery:  Daily Progress Note                 PATIENT NAME: Nina Conrad     TODAY'S DATE: 12/4/2020, 5:29 AM  CC:  Feeling great    SUBJECTIVE:     Pt seen and examined at bedside. No acute events overnight. Pt states feeling great this morning. He is tolerating diet, no n/v. Pain controlled. Afebrile. OBJECTIVE:   VITALS:  BP (!) 164/48   Pulse 98   Temp 97.6 °F (36.4 °C) (Oral)   Resp 16   Ht 5' 10\" (1.778 m)   Wt 187 lb (84.8 kg)   SpO2 95%   BMI 26.83 kg/m²      INTAKE/OUTPUT:      Intake/Output Summary (Last 24 hours) at 12/4/2020 0529  Last data filed at 12/4/2020 0151  Gross per 24 hour   Intake 1163 ml   Output 550 ml   Net 613 ml       PHYSICAL EXAM:  General Appearance: awake, alert, oriented, in no acute distress  HEENT:  Normocephalic, atraumatic, mucus membranes moist   Heart: s1+s2  Lungs: equal and symmetric chest rise/fall, non-labored   Abdomen:soft, nd, attp. Incisions c/d/i w/ skin glue    Extremities: No cyanosis, pitting edema, rashes noted. Skin: Skin color, texture, turgor normal. No rashes or lesions. Data:  Cbc pending     Radiology Review:     ASSESSMENT:  Active Hospital Problems    Diagnosis Date Noted    Coronary artery disease [I25.10]     Acute cholecystitis [K81.0] 12/02/2020    GERD without esophagitis [K21.9]     Elevated blood pressure reading [R03.0]     Vitamin D deficiency [E55.9] 05/03/2016    Essential hypertension [I10] 10/14/2015       1. 70 y.o. male POD#1 s/p robotic cholecystectomy 2/2 acute cholecystitis     Plan:  Diet: low fat  Analgesia: norco  Saline lock IVF  Ok to stop abx  dvt ppx: lovenox    Overall pt doing well. From a surgery stand point recommend d/c home today. Scripts for analgesics/zofran/colace on chart. Electronically signed by Julius Jimenez DO  on 12/4/2020 at 5:29 AM     General Surgery Attending Attestation Note    Patient was seen and examined. I agree with the above resident's exam, assessment and plan. Doing well  Tolerating low fat diet  Incisions c/d/i  Abdomen soft  Ok to discharge home today and follow up with Dr. Harjit Bee in 1 week    Dedra Gamez, [address], [address], [facility],[address]  Office: [phone]  After Hours: Please call answering service

## 2020-12-04 NOTE — PROGRESS NOTES
[facility]    Office: [address], DO, Edycandido Jaden, DO, Brianna Carr, DO, Melony Mercer Blood, DO, Tierney Traore MD, Robert Mariee MD, Nancy Baugh MD, Sergei Shell MD, Sharyn Mendiola MD, Gladis Dinero MD, Estelle Baumann MD, Antoinette Pozo MD, Yue Rubalcava MD, Woo Avitia, DO, Lio Ramsey MD, Aurora Barnard MD, Sharan Colindres, DO, Adrian Reagan MD,  Lc Brito DO, Nicolasa Bae MD, Kyle Dey MD, Wm Lopez, [facility], [facility], CNP, Brit Levy, CNP, Paul Lipscomb, CNS, Marysol Jaime, CNP, Deonna Lara, CNP, Ivonne Marie, CNP, Agustín Larose, CNP, Angela Elizondo, CNP, LAMINE ArcherC, Micah Lackey, [facility], Will Gracia, CNP, Lara Wright, CNP, Guzman Decker, CNP, Pauline Young, CNP, [facility], [address] [place] [address]    Progress Note    12/4/2020    11:23 AM    Name:   Elisha Avalos  MRN:     [id number]     [place]:      [de-identified]   Room:   [facility] Day:  2  Admit Date:  12/2/2020  1:48 AM    PCP:   Rene Clayton MD  Code Status:  Full Code    Subjective:     C/C:   Chief Complaint   Patient presents with    Abdominal Pain       Interval History Status:  improved  Postop day 1  Pain controlled  Doing well with diet  Hoping to go home today    Data Base Updates:  WBC 19.0High k/uL RBC 4.72 m/uL Hemoglobin 13.8     Cultures remain negative:  Specimen Source: Blood Specimen Description . BLOOD Special Requests 12ML RT AC Culture NO GROWTH 2 DAYS       Brief History:     53-year-old male admitted to the emergency room with nausea, vomiting, and right upper quadrant pain  He does acknowledge that he had eaten spaghetti last night with extra sauce  No prior history of similar symptomatology or food dyscrasias  Dietary history otherwise negative  Travel history negative     Initial exam revealed:  Patient awake and orientated  Heart is regular in rate and rhythm  Lungs are clear, no rales or rhonchi /wheeze  Abdomen is soft, nondistended, bowel sounds present  Still has mild right upper quadrant tenderness  Norwood sign negative  Extremities no edema, no tenderness  No gross motor or sensory deficits  No cyanosis      Ultrasound:  BILIARY SYSTEM:  Gallbladder is unremarkable without evidence of   pericholecystic fluid or wall thickening.  Small gallbladder neck calcified   stone seen on recent CT examination is unable to be visualized   sonographically.  Negative sonographic Norwood's sign. Common bile duct is within normal limits measuring 5      CT:  1.  Distended gallbladder with a 5 mm intraluminal stone near the gallbladder  neck.  No pericholecystic fluid.  Gallbladder ultrasound may be helpful for  further evaluation, especially if there is high clinical concern for acute  cholecystitis.   2.  No bowel obstruction.  Normal appendix.   3.  Colonic diverticulosis without acute diverticulitis.   4.  Mild haziness about the urinary bladder could relate to underlying  cystitis.  No obstructive uropathy.     EKG:  Narrative:  Sinus rhythm with marked sinus arrhythmia   Otherwise normal ECG      History of cath and PCI in 2006  He follows with [facility]     BP as high as 182/111 noted     Preliminary plan:  Patient has been admitted  Surgical evaluation in progress  Pain control  Antiemetics  Hydration  Antibiotic coverage  Blood Pressure - Monitor and control   Hydralazine as needed for BP control  Reflux precautions   Pepcid     On 12/3 the patient underwent:     PROCEDURE:  Robotic cholecystectomy. Postoperative course was as expected  Patient's condition was stabilized  He was discharged in improved condition    Medications:      Allergies:  No Known Allergies    Current Meds:   Scheduled Meds:    metoprolol succinate  50 mg Oral Daily    enoxaparin  40 mg Subcutaneous Daily    piperacillin-tazobactam  3.375 g Intravenous Q8H    sodium chloride flush  10 mL Intravenous 2 times per day    famotidine (PEPCID) injection  20 mg Intravenous BID    latanoprost  1 drop Both Eyes Nightly    timolol  1 drop Both Eyes BID     Continuous Infusions:    dextrose 5% and 0.45% NaCl with KCl 20 mEq 50 mL/hr at 12/04/20 0231     PRN Meds: HYDROcodone-acetaminophen, sodium chloride flush, potassium chloride **OR** potassium alternative oral replacement **OR** potassium chloride, magnesium sulfate, acetaminophen **OR** [DISCONTINUED] acetaminophen, polyethylene glycol, nicotine, ondansetron **OR** ondansetron, hydrALAZINE    Data:     Past Medical History:   has a past medical history of Coronary artery disease, GERD without esophagitis, and Hypertension. Social History:   reports that he has never smoked. He has never used smokeless tobacco. He reports that he does not drink alcohol. Family History:   Family History   Problem Relation Age of Onset    Cancer Mother     Cirrhosis Father        Review of Systems:     Review of Systems   Constitutional: Positive for activity change (Improved) and appetite change (Back to baseline). Respiratory: Negative for cough and shortness of breath. Cardiovascular: Negative for chest pain and palpitations. Gastrointestinal: Positive for abdominal pain (Mild incisional pain). Negative for nausea and vomiting. Genitourinary: Negative for flank pain and hematuria. Musculoskeletal: Positive for back pain (Resolved). Physical Examination:        Physical Exam  Vitals signs reviewed. Constitutional:       General: He is not in acute distress. Appearance: He is not diaphoretic. HENT:      Head: Normocephalic. Nose: Nose normal.   Eyes:      General: No scleral icterus. Conjunctiva/sclera: Conjunctivae normal.   Neck:      Musculoskeletal: Neck supple. Trachea: No tracheal deviation. Cardiovascular:      Rate and Rhythm: Normal rate and regular rhythm. Pulmonary:      Effort: Pulmonary effort is normal. No respiratory distress. Breath sounds: Normal breath sounds. No wheezing or rales. Chest:      Chest wall: No tenderness. Abdominal:      General: Bowel sounds are normal. There is no distension. Palpations: Abdomen is soft. Tenderness: There is abdominal tenderness (Mild incisional). There is no guarding. Musculoskeletal:         General: No tenderness. Skin:     General: Skin is warm and dry. Neurological:      Mental Status: He is alert and oriented to person, place, and time. Vitals:  BP (!) 164/48   Pulse 98   Temp 97.6 °F (36.4 °C) (Oral)   Resp 16   Ht 5' 10\" (1.778 m)   Wt 187 lb (84.8 kg)   SpO2 95%   BMI 26.83 kg/m²   Temp (24hrs), Av.8 °F (35.4 °C), Min:58.5 °F (14.7 °C), Max:98.6 °F (37 °C)    No results for input(s): POCGLU in the last 72 hours. I/O (24Hr):     Intake/Output Summary (Last 24 hours) at 2020 1123  Last data filed at 2020 0151  Gross per 24 hour   Intake 1163 ml   Output 550 ml   Net 613 ml       Labs:  Hematology:  Recent Labs     20  0558 20  0543   WBC 10.5 7.5 19.0*   RBC 5.07 4.48 4.72   HGB 15.0 13.4 13.8   HCT 43.2 39.2* 40.6*   MCV 85.2 87.5 86.0   MCH 29.6 29.9 29.2   MCHC 34.7 34.2 34.0   RDW 12.7 12.6 12.4    166 211   MPV 10.3 9.8 10.2     Chemistry:  Recent Labs     20  0238 20  0558    139   K 3.9 4.1    106   CO2 25 26   GLUCOSE 163* 138*   BUN 18 9   CREATININE 0.89 1.01   MG  --  2.1   ANIONGAP 11 7*   LABGLOM >60 >60   GFRAA >60 >60   CALCIUM 9.7 9.0   PHOS  --  2.0*   TROPHS 7  --      Recent Labs     20  0558   PROT 7.2 6.2*   LABALBU 5.0 4.3   AST 19 18   ALT 22 32   ALKPHOS 83 66   BILITOT 0.48 0.83   AMYLASE  --  45   LIPASE 35 24     ABG:No results found for: POCPH, PHART, PH, POCPCO2, WBV9ALY, PCO2, POCPO2, PO2ART, PO2, POCHCO3, RHF7FNZ, HCO3, NBEA, PBEA, BEART, BE, THGBART, THB, YWO4YVU, XVVL4FRU, A4NACCNM, O2SAT, FIO2  Lab Results   Component Value Date/Time

## 2020-12-04 NOTE — PROGRESS NOTES
CLINICAL PHARMACY NOTE: MEDS TO [address] Select Patient?: Yes  Total # of Prescriptions Filled: 3   The following medications were delivered to the patient:  · ONDANSETRON 4 MG TABS  · STOOL SOFTENER 100 MG CAPS  · HYDROCODONE/APAP 5/325 MG TABS  Total # of Interventions Completed: 0  Time Spent (min): 5    Additional Documentation:  DELIVERED TO PT'S ROOM 12/4/20. $7.30 COPAY, PAID CASH.

## 2020-12-04 NOTE — OP NOTE
[id number] [facility] [address]                [address] [place] [address]                                OPERATIVE REPORT    PATIENT NAME: Jarred Rangel                     :        1945  MED REC NO:   [id number]                             ROOM:       2104  ACCOUNT NO:   [de-identified]                           ADMIT DATE: 2020  PROVIDER:     Donal Sinha    DATE OF PROCEDURE:  2020    PREOPERATIVE DIAGNOSIS:  Acute colic. POSTOPERATIVE DIAGNOSIS:  Acute cholecystitis. PROCEDURE:  Robotic multiple cholecystectomy. SURGEON:  Shankar Gonzalez MD, [facility]    ANESTHESIA:  General endotracheal.    ESTIMATED BLOOD LOSS:  50 mL. DRAINS:  None. FLUIDS:  1200 mL of crystalloids. INDICATIONS:  This is a 51-year-old male who had presented with symptoms  of cholecystitis, who comes in for cholecystectomy. OPERATIVE PROCEDURE:  The patient was brought to the operating room and  placed in supine position. After induction of general anesthesia, the  patient's abdomen was prepped and draped in the usual sterile fashion. Initially, an infraumbilical incision was made. Incision was taken  through fascia. The fascia was grasped by using 2-0 Vicryl sutures and  the fascia was next cut in between the sutures and the abdominal cavity  was next entered. Demarcus trocar with balloon was inserted and abdomen  was next insufflated. On inserting the laparoscope, there was no  stigmata of injury secondary to trocar insertion. Under direct vision,  an 8-mm trocar was inserted in the left lateral abdomen and two 8-mm  trocars in the right lateral abdomen. The patient was next placed under  reverse Trendelenburg position and the body turned to the patient's left  side. Docking of the robot was next undertaken. Examination revealed a  very tense and distended gallbladder with hyperemic wall suggesting  acute cholecystitis. Carefully, the gallbladder was grasped. We  mobilized the gallbladder in retrograde manner and also near the neck of  the gallbladder. Omental attachments to the area were dissected. We  noticed significant edema to the tissue. The findings were all  consistent with acute cholecystitis. ICG was used to identify the  biliary system. Cystic duct was next dissected and clipped and cut. Similarly, the cystic artery also was clipped and cut. Next, the  gallbladder was taken off from the liver bed. As we were dissecting  gallbladder off the liver bed, gallbladder started to leak bilious  fluid. We used suction irrigation to suction out the bilious fluid. Next, the gallbladder was taken off from liver bed. EndoCatch was used  to retrieve the gallbladder. After dissection and irrigation of the  gallbladder area, all trocars were removed and the air was evacuated. 0  PDS was used in a figure-of eight fashion to close the umbilical fascial  defect. 4-0 Vicryl was used to approximate the skin. Dermabond was  applied. The patient was brought to recovery room in stable condition.         Chapis Tapia    D: 12/03/2020 16:34:43       T: 12/03/2020 20:37:04     [email or web address]  Job#: [id number]     Doc#: [id number]    CC:

## 2020-12-04 NOTE — FLOWSHEET NOTE
Patient is awake and alert while reclining. Patient is approachable and engages in conversation. Patient reports that he will be discharged today and his spouse will pick him up. Patient reports that he is doing well and denies any spiritual or emotional concerns. Writer provides listening presence and emotional support. Patient expresses appreciation for the visit. Spiritual Care will follow as needed.        12/04/20 1015   Encounter Summary   Services provided to: Patient   Referral/Consult From: [facility]   Support System Spouse   Continue Visiting   (12/4/20)   Complexity of Encounter Low   Length of Encounter 15 minutes   Routine   Type Initial   Assessment Approachable   Intervention Active listening;Explored feelings, thoughts, concerns;Explored coping resources;Nurtured hope   Outcome Expressed gratitude

## 2020-12-04 NOTE — PROGRESS NOTES
Pt discharged to home in good condition with belongings  Discharge instructions given & signed  \"Meds To Beds\" medication at bedside  Pt denies having any further questions at this time  Locked up home medication(s)/personal items given to patient at discharge  Patient/family state they have everything they were admitted with.   Hibiclens sent home with patient

## 2020-12-05 NOTE — DISCHARGE SUMMARY
[facility]    Office: [address], DO, Jaretjonoafshin Laquita, DO, Xena Engel, DO, Demi Jiménez Blood, DO, Isaura Flores MD, Ashtyn Floyd MD, Estelle Davis MD, Jenny Horowitz MD, Calvin Blackburn MD, Jose Santos MD, Merlyn Roland MD, Dee Rowland MD, Yue Verma MD, Tosha Gray, DO, Margarette Levy MD, Domo Carrasquillo MD, Dashawn Mcallister DO, Carlitos Perez MD,  Bill Parry DO, Luis Garcia MD, Alfa Escobar MD, Catalina Carranza, [facility], [facility], CNP, Court Acevedo, CNP, Doris Israel, CNS, Bebe Pastalexia, CNP, Justine Tavares, CNP, Radha Lindsay, CNP, Vanessa Larsen, CNP, Mellisa Driver, CNP, Praful Horne PA-C, Ramesh Milner, [facility], Cholo Wolf, CNP, Louis Gomez, CNP, Terry Banda, CNP, Carlota Bianchi, CNP, Reji Reddy, [address]    Discharge Summary    Patient ID: Jarvis Spaulding  :  1945   MRN: [id number]     ACCOUNT:  [de-identified]   Patient's PCP: Dorian Landry MD  Admit Date: 2020   Discharge Date: 2020    Discharge Physician: Babak Gordon DO     The patient was seen and examined on day of discharge and this discharge summary is in conjunction with any daily progress note from day of discharge.     Active Discharge Diagnoses:     Primary Problem  Acute cholecystitis      Hospital Problems  Active Hospital Problems    Diagnosis Date Noted    S/P laparoscopic cholecystectomy [Z90.49]     Coronary artery disease [I25.10]     Acute cholecystitis [K81.0] 2020    GERD without esophagitis [K21.9]     Elevated blood pressure reading [R03.0]     Vitamin D deficiency [E55.9] 2016    Essential hypertension [I10] 10/14/2015         Hospital Course:     Brief History:  68-year-old male admitted to the emergency room with nausea, vomiting, and right upper quadrant pain  He does acknowledge that he had eaten spaghetti last night with extra sauce  No prior history of similar symptomatology or food dyscrasias  Dietary history otherwise negative  Travel history negative     Initial exam revealed:  Patient awake and orientated  Heart is regular in rate and rhythm  Lungs are clear, no rales or rhonchi /wheeze  Abdomen is soft, nondistended, bowel sounds present  Still has mild right upper quadrant tenderness  Norwood sign negative  Extremities no edema, no tenderness  No gross motor or sensory deficits  No cyanosis      Ultrasound:  BILIARY SYSTEM:  Gallbladder is unremarkable without evidence of   pericholecystic fluid or wall thickening.  Small gallbladder neck calcified   stone seen on recent CT examination is unable to be visualized   sonographically.  Negative sonographic Norwood's sign.    Common bile duct is within normal limits measuring 5      CT:  1.  Distended gallbladder with a 5 mm intraluminal stone near the gallbladder  neck.  No pericholecystic fluid.  Gallbladder ultrasound may be helpful for  further evaluation, especially if there is high clinical concern for acute  cholecystitis.   2.  No bowel obstruction.  Normal appendix.   3.  Colonic diverticulosis without acute diverticulitis.   4.  Mild haziness about the urinary bladder could relate to underlying  cystitis.  No obstructive uropathy.     EKG:  Narrative:  Sinus rhythm with marked sinus arrhythmia   Otherwise normal ECG      History of cath and PCI in 2006  He follows with [facility]     BP as high as 182/111 noted     Preliminary plan:  Patient has been admitted  Surgical evaluation in progress  Pain control  Antiemetics  Hydration  Antibiotic coverage  Blood Pressure - Monitor and control   Hydralazine as needed for BP control  Reflux precautions   Pepcid      On 12/3 the patient underwent:     PROCEDURE:  Robotic cholecystectomy.     Postoperative course was as expected  Patient's condition was stabilized  He was discharged in improved condition    Discharge plan:     Surgical evaluation and follow-up as scheduled:  Bais  Pain control  Maintain Hydration  Blood Pressure - Monitor and control:   Metoprolol resumed  Reflux precautions   Protonix  Vit D supplementation   Discharge planning  Will discharge when arrangements complete and ok with other services. Follow-up with PCP in one week, Donnie Leach MD  Notify PCP of discharge     No discharge procedures on file. Significant Diagnostic Studies:     Labs / Micro:  Labs:  Hematology:  Recent Labs     12/02/20 0238 12/03/20  0558 12/04/20  0543   WBC 10.5 7.5 19.0*   RBC 5.07 4.48 4.72   HGB 15.0 13.4 13.8   HCT 43.2 39.2* 40.6*   MCV 85.2 87.5 86.0   MCH 29.6 29.9 29.2   MCHC 34.7 34.2 34.0   RDW 12.7 12.6 12.4    166 211   MPV 10.3 9.8 10.2     Chemistry:  Recent Labs     12/02/20 0238 12/03/20  0558    139   K 3.9 4.1    106   CO2 25 26   GLUCOSE 163* 138*   BUN 18 9   CREATININE 0.89 1.01   MG  --  2.1   ANIONGAP 11 7*   LABGLOM >60 >60   GFRAA >60 >60   CALCIUM 9.7 9.0   PHOS  --  2.0*   TROPHS 7  --      Recent Labs     12/02/20 0238 12/03/20  0558   PROT 7.2 6.2*   LABALBU 5.0 4.3   AST 19 18   ALT 22 32   ALKPHOS 83 66   BILITOT 0.48 0.83   AMYLASE  --  45   LIPASE 35 24         Radiology:    Ct Abdomen Pelvis W Iv Contrast Additional Contrast? None    Result Date: 12/2/2020  EXAMINATION: CT OF THE ABDOMEN AND PELVIS WITH CONTRAST 12/2/2020 3:16 am TECHNIQUE: CT of the abdomen and pelvis was performed with the administration of intravenous contrast. Multiplanar reformatted images are provided for review. Dose modulation, iterative reconstruction, and/or weight based adjustment of the mA/kV was utilized to reduce the radiation dose to as low as reasonably achievable. COMPARISON: None.  HISTORY: ORDERING SYSTEM PROVIDED HISTORY: pain TECHNOLOGIST PROVIDED HISTORY: pain Reason for Exam: abd pain Acuity: Acute Type of Exam: Initial Additional signs and symptoms: N/V Relevant Medical/Surgical History: HTN FINDINGS: Lower Chest: No acute abnormality. Liver: Normal. Gallbladder and Bile Ducts: Distended gallbladder with a 5 mm intraluminal stone near the gallbladder neck. No pericholecystic fluid. No biliary ductal dilatation. Spleen: Normal. Adrenal Glands: Normal. Pancreas: Normal. Genitourinary: Both kidneys are symmetric in size and enhancement. No urinary stones or hydronephrosis. Both ureters are normal in course and caliber. Mild haziness about the urinary bladder. Prostate gland measures 5.0 x 4.0 cm. Bowel: The stomach is incompletely distended and not well evaluated. The small bowel and colon are normal in caliber. Normal appendix. Colonic diverticulosis without acute diverticulitis. . Vasculature: Normal. Bones and Soft Tissues: No acute abnormality. Retroperitoneum/Mesentery: No intraperitoneal free air, ascites or fluid collection. No lymphadenopathy in the abdomen or pelvis. 1.  Distended gallbladder with a 5 mm intraluminal stone near the gallbladder neck. No pericholecystic fluid. Gallbladder ultrasound may be helpful for further evaluation, especially if there is high clinical concern for acute cholecystitis. 2.  No bowel obstruction. Normal appendix. 3.  Colonic diverticulosis without acute diverticulitis. 4.  Mild haziness about the urinary bladder could relate to underlying cystitis. No obstructive uropathy. Us Gallbladder Ruq    Result Date: 12/2/2020  EXAMINATION: RIGHT UPPER QUADRANT ULTRASOUND 12/2/2020 6:59 am COMPARISON: CT abdomen and pelvis earlier same day HISTORY: ORDERING SYSTEM PROVIDED HISTORY: RUQ pain concern acute cholecystitis FINDINGS: Exam is suboptimal due to overlying bowel gas and rib shadowing. LIVER: The liver demonstrates grossly normal echogenicity without evidence of intrahepatic biliary ductal dilatation. Liver length is 14.3 cm. Portal vein demonstrates hepatopetal flow. BILIARY SYSTEM:  Gallbladder is unremarkable without evidence of pericholecystic fluid or wall thickening. Small gallbladder neck calcified stone seen on recent CT examination is unable to be visualized sonographically. Negative sonographic Norwood's sign. Common bile duct is within normal limits measuring 5 mm. RIGHT KIDNEY: The right kidney is grossly unremarkable without evidence of hydronephrosis. Right renal length is 9.1 cm. PANCREAS:  Not visualized OTHER: No evidence of right upper quadrant ascites. Negative right upper quadrant ultrasound. RECOMMENDATIONS: If there is continued clinical concern for cholecystitis then consider further assessment with HIDA scan. Consultations:    Consults:     Final Specialist Recommendations/Findings:   IP CONSULT TO GENERAL SURGERY  IP CONSULT TO INTERNAL MEDICINE  IP CONSULT TO GENERAL SURGERY        Discharged Condition:    Stable     Disposition: Home    Physician Follow Up:   Vida Habermann, MD  . [facility] [address] [place] [address]  [phone]    In 1 week  s/p robotic cholecystectomy       Activity:  Surgical restrictions    Diet:  cardiac diet     Discharge Medications:      Medication List      START taking these medications    docusate sodium 100 MG capsule  Commonly known as:  COLACE  Take 1 capsule by mouth 2 times daily as needed for Constipation     HYDROcodone-acetaminophen 5-325 MG per tablet  Commonly known as:  Norco  Take 1 tablet by mouth every 6 hours as needed for Pain for up to 7 days. Intended supply: 7 days.  Take lowest dose possible to manage pain     ondansetron 4 MG tablet  Commonly known as:  ZOFRAN  Take 1 tablet by mouth every 8 hours as needed for Nausea or Vomiting     pantoprazole 40 MG tablet  Commonly known as:  PROTONIX  Take 1 tablet by mouth every morning (before breakfast)        CONTINUE taking these medications    aspirin 81 MG tablet     Centrum Silver Tabs     latanoprost 0.005 % ophthalmic solution  Commonly known as:  XALATAN     metoprolol tartrate 25 MG tablet  Commonly known as:  LOPRESSOR  TAKE 1 TABLET DAILY     timolol 0.5 % ophthalmic gel-forming  Commonly known as:  TIMOPTIC-XE     vitamin D 25 MCG (1000 UT) Tabs tablet  Commonly known as:  CHOLECALCIFEROL           Where to Get Your Medications      These medications were sent to [facility], [address] [address]    Phone:  [phone]   · pantoprazole 40 MG tablet     You can get these medications from any pharmacy    Bring a paper prescription for each of these medications  · docusate sodium 100 MG capsule  · HYDROcodone-acetaminophen 5-325 MG per tablet  · ondansetron 4 MG tablet         Time Spent on discharge is  20 mins in patient examination, evaluation, counseling, medication reconciliation, discharge plan and follow up. Electronically signed by   Cezar Sawant DO  12/4/2020  9:25 PM      Thank you Dr. Ashlyn Dash MD for the opportunity to be involved in this patient's care.

## 2020-12-07 LAB — SURGICAL PATHOLOGY REPORT: NORMAL

## 2020-12-08 ENCOUNTER — HOSPITAL ENCOUNTER (OUTPATIENT)
Age: 75
Setting detail: SPECIMEN
Discharge: HOME | End: 2020-12-08
Payer: MEDICARE

## 2020-12-08 LAB
-: NORMAL
ANION GAP SERPL CALCULATED.3IONS-SCNC: 12 MMOL/L (ref 9–17)
BUN BLDV-MCNC: 15 MG/DL (ref 8–23)
BUN/CREAT BLD: ABNORMAL (ref 9–20)
CALCIUM SERPL-MCNC: 9.6 MG/DL (ref 8.6–10.4)
CHLORIDE BLD-SCNC: 103 MMOL/L (ref 98–107)
CHOLESTEROL/HDL RATIO: 4.9
CHOLESTEROL: 180 MG/DL
CO2: 24 MMOL/L (ref 20–31)
CREAT SERPL-MCNC: 0.84 MG/DL (ref 0.7–1.2)
CULTURE: NORMAL
CULTURE: NORMAL
GFR AFRICAN AMERICAN: >60 ML/MIN
GFR NON-AFRICAN AMERICAN: >60 ML/MIN
GFR SERPL CREATININE-BSD FRML MDRD: ABNORMAL ML/MIN/{1.73_M2}
GFR SERPL CREATININE-BSD FRML MDRD: ABNORMAL ML/MIN/{1.73_M2}
GLUCOSE FASTING: 104 MG/DL (ref 70–99)
HDLC SERPL-MCNC: 37 MG/DL
LDL CHOLESTEROL: 112 MG/DL (ref 0–130)
Lab: NORMAL
Lab: NORMAL
POTASSIUM SERPL-SCNC: 4 MMOL/L (ref 3.7–5.3)
PROSTATE SPECIFIC ANTIGEN: 3.07 UG/L
REASON FOR REJECTION: NORMAL
SODIUM BLD-SCNC: 139 MMOL/L (ref 135–144)
SPECIMEN DESCRIPTION: NORMAL
SPECIMEN DESCRIPTION: NORMAL
T4 TOTAL: 9.4 UG/DL (ref 4.5–10.9)
TRIGL SERPL-MCNC: 157 MG/DL
TSH SERPL DL<=0.05 MIU/L-ACNC: 1.85 MIU/L (ref 0.3–5)
VITAMIN B-12: 1236 PG/ML (ref 232–1245)
VITAMIN D 25-HYDROXY: 58.8 NG/ML (ref 30–100)
VLDLC SERPL CALC-MCNC: ABNORMAL MG/DL (ref 1–30)
ZZ NTE CLEAN UP: ORDERED TEST: NORMAL
ZZ NTE WITH NAME CLEAN UP: SPECIMEN SOURCE: NORMAL

## 2021-12-06 ENCOUNTER — HOSPITAL ENCOUNTER (OUTPATIENT)
Age: 76
Setting detail: SPECIMEN
Discharge: HOME | End: 2021-12-06

## 2021-12-06 DIAGNOSIS — E53.8 B12 DEFICIENCY: ICD-10-CM

## 2021-12-06 DIAGNOSIS — I10 ESSENTIAL HYPERTENSION: ICD-10-CM

## 2021-12-06 DIAGNOSIS — E07.9 THYROID DISORDER: ICD-10-CM

## 2021-12-06 DIAGNOSIS — R73.01 IFG (IMPAIRED FASTING GLUCOSE): ICD-10-CM

## 2021-12-06 DIAGNOSIS — N42.9 PROSTATE DISORDER: ICD-10-CM

## 2021-12-06 DIAGNOSIS — E55.9 VITAMIN D DEFICIENCY: ICD-10-CM

## 2021-12-06 DIAGNOSIS — E78.2 MIXED HYPERLIPIDEMIA: ICD-10-CM

## 2021-12-06 LAB
ALBUMIN SERPL-MCNC: 4.9 G/DL (ref 3.5–5.2)
ALBUMIN/GLOBULIN RATIO: 2 (ref 1–2.5)
ALP BLD-CCNC: 89 U/L (ref 40–129)
ALT SERPL-CCNC: 23 U/L (ref 5–41)
ANION GAP SERPL CALCULATED.3IONS-SCNC: 12 MMOL/L (ref 9–17)
AST SERPL-CCNC: 19 U/L
BILIRUB SERPL-MCNC: 0.58 MG/DL (ref 0.3–1.2)
BUN BLDV-MCNC: 12 MG/DL (ref 8–23)
BUN/CREAT BLD: NORMAL (ref 9–20)
CALCIUM SERPL-MCNC: 9.7 MG/DL (ref 8.6–10.4)
CHLORIDE BLD-SCNC: 103 MMOL/L (ref 98–107)
CHOLESTEROL/HDL RATIO: 4.2
CHOLESTEROL: 191 MG/DL
CO2: 24 MMOL/L (ref 20–31)
CREAT SERPL-MCNC: 0.82 MG/DL (ref 0.7–1.2)
GFR AFRICAN AMERICAN: >60 ML/MIN
GFR NON-AFRICAN AMERICAN: >60 ML/MIN
GFR SERPL CREATININE-BSD FRML MDRD: NORMAL ML/MIN/{1.73_M2}
GFR SERPL CREATININE-BSD FRML MDRD: NORMAL ML/MIN/{1.73_M2}
GLUCOSE FASTING: 95 MG/DL (ref 70–99)
HCT VFR BLD CALC: 47 % (ref 40.7–50.3)
HDLC SERPL-MCNC: 46 MG/DL
HEMOGLOBIN: 16 G/DL (ref 13–17)
LDL CHOLESTEROL: 117 MG/DL (ref 0–130)
MCH RBC QN AUTO: 30 PG (ref 25.2–33.5)
MCHC RBC AUTO-ENTMCNC: 34 G/DL (ref 28.4–34.8)
MCV RBC AUTO: 88 FL (ref 82.6–102.9)
NRBC AUTOMATED: 0 PER 100 WBC
PDW BLD-RTO: 13.1 % (ref 11.8–14.4)
PLATELET # BLD: 232 K/UL (ref 138–453)
PMV BLD AUTO: 10.6 FL (ref 8.1–13.5)
POTASSIUM SERPL-SCNC: 4.5 MMOL/L (ref 3.7–5.3)
PROSTATE SPECIFIC ANTIGEN: 3.97 UG/L
RBC # BLD: 5.34 M/UL (ref 4.21–5.77)
SODIUM BLD-SCNC: 139 MMOL/L (ref 135–144)
TOTAL PROTEIN: 7.4 G/DL (ref 6.4–8.3)
TRIGL SERPL-MCNC: 139 MG/DL
TSH SERPL DL<=0.05 MIU/L-ACNC: 1.26 MIU/L (ref 0.3–5)
VITAMIN B-12: 975 PG/ML (ref 232–1245)
VITAMIN D 25-HYDROXY: 56.3 NG/ML (ref 30–100)
VLDLC SERPL CALC-MCNC: NORMAL MG/DL (ref 1–30)
WBC # BLD: 6.8 K/UL (ref 3.5–11.3)

## 2021-12-08 LAB — T4 TOTAL: 7.9 UG/DL (ref 4.5–10.9)

## 2021-12-13 PROBLEM — E78.2 MIXED HYPERLIPIDEMIA: Status: ACTIVE | Noted: 2021-12-13

## 2022-01-24 ENCOUNTER — HOSPITAL ENCOUNTER (EMERGENCY)
Age: 77
Discharge: HOME | End: 2022-01-24
Attending: EMERGENCY MEDICINE
Payer: MEDICARE

## 2022-01-24 ENCOUNTER — APPOINTMENT (OUTPATIENT)
Dept: CT IMAGING | Age: 77
End: 2022-01-24
Payer: MEDICARE

## 2022-01-24 VITALS
HEART RATE: 113 BPM | RESPIRATION RATE: 18 BRPM | HEIGHT: 70 IN | TEMPERATURE: 98.8 F | OXYGEN SATURATION: 97 % | WEIGHT: 183 LBS | SYSTOLIC BLOOD PRESSURE: 204 MMHG | DIASTOLIC BLOOD PRESSURE: 100 MMHG | BODY MASS INDEX: 26.2 KG/M2

## 2022-01-24 DIAGNOSIS — M54.2 NECK PAIN: Primary | ICD-10-CM

## 2022-01-24 PROCEDURE — 99284 EMERGENCY DEPT VISIT MOD MDM: CPT

## 2022-01-24 PROCEDURE — 72125 CT NECK SPINE W/O DYE: CPT

## 2022-01-24 RX ORDER — TRAMADOL HYDROCHLORIDE 50 MG/1
50 TABLET ORAL EVERY 6 HOURS PRN
Qty: 12 TABLET | Refills: 0 | Status: SHIPPED | OUTPATIENT
Start: 2022-01-24 | End: 2022-01-27

## 2022-01-24 RX ORDER — CYCLOBENZAPRINE HCL 10 MG
10 TABLET ORAL 3 TIMES DAILY PRN
Qty: 21 TABLET | Refills: 0 | Status: SHIPPED | OUTPATIENT
Start: 2022-01-24 | End: 2022-02-03

## 2022-01-24 ASSESSMENT — PAIN SCALES - GENERAL: PAINLEVEL_OUTOF10: 4

## 2022-01-24 NOTE — ED PROVIDER NOTES
[facility] ED  eMERGENCY dEPARTMENT eNCOUnter      Pt Name: Chris Lemus  MRN: [id number]  [place] 1945  Date of evaluation: 1/24/2022  Provider: KULDEEP Jang CNP    CHIEF COMPLAINT       Chief Complaint   Patient presents with    Fall     Pt reports fell on ice earlier; c/o neck pain; shoulder pain; denies LOC; not on blood thinners    Neck Pain    Shoulder Pain         HISTORY OF PRESENT ILLNESS  (Location/Symptom, Timing/Onset, Context/Setting, Quality, Duration, Modifying Factors, Severity.)   Chris Lemus is a 78 y.o. male who presents to the emergency department via private auto for pain to his left neck and shoulder s/p slip and fall today. He fell backwards. States he did not strike his head. Denies LOC, vision changes, weakness. Reports N/T to his left arm and bilateral hands. Denies pain to his chest, abdomen, back, hips. Rates his pain 4/10 at this time. Nursing Notes were reviewed. ALLERGIES     Patient has no known allergies.     CURRENT MEDICATIONS       Previous Medications    ASPIRIN 81 MG TABLET    Take 81 mg by mouth daily    DOCUSATE SODIUM (COLACE) 100 MG CAPSULE    Take 1 capsule by mouth 2 times daily as needed for Constipation    LATANOPROST (XALATAN) 0.005 % OPHTHALMIC SOLUTION    Place 1 drop into both eyes nightly     METOPROLOL TARTRATE (LOPRESSOR) 25 MG TABLET    TAKE 1 TABLET DAILY    MULTIPLE VITAMINS-MINERALS (CENTRUM SILVER) TABS    Take 1 tablet by mouth daily    TIMOLOL (TIMOPTIC) 0.5 % OPHTHALMIC SOLUTION    Apply 1 drop to eye daily    VITAMIN D (CHOLECALCIFEROL) 25 MCG (1000 UT) TABS TABLET    Take 1,000 Units by mouth daily       PAST MEDICAL HISTORY         Diagnosis Date    Coronary artery disease     GERD without esophagitis     Hypertension        SURGICAL HISTORY           Procedure Laterality Date    [facility]AC [facility]  [facility]    CHOLECYSTECTOMY, LAPAROSCOPIC N/A 12/3/2020    CHOLECYSTECTOMY LAPAROSCOPIC ROBOTIC XI performed by Ana Ribera MD at [address] COLONOSCOPY      polyp removal    TONSILLECTOMY      VASECTOMY           FAMILY HISTORY           Problem Relation Age of Onset    Cancer Mother     Cirrhosis Father      Family Status   Relation Name Status    Mother      Father      Brother  Alive   [address] [place] [address]      reports that he has never smoked. He has never used smokeless tobacco. He reports that he does not drink alcohol. REVIEW OF SYSTEMS    (2-9 systems for level 4, 10 or more for level 5)     Review of Systems   Constitutional: Negative for chills, diaphoresis, fatigue and fever. HENT: Negative for ear discharge, facial swelling and trouble swallowing. Eyes: Negative for photophobia, pain and visual disturbance. Respiratory: Negative for shortness of breath. Cardiovascular: Negative for chest pain. Gastrointestinal: Negative for abdominal pain, nausea and vomiting. Genitourinary: Negative for difficulty urinating. Musculoskeletal: Positive for arthralgias, myalgias and neck pain. Negative for back pain. Skin: Negative for color change, rash and wound. Neurological: Positive for numbness. Negative for dizziness, syncope, facial asymmetry, speech difficulty, weakness, light-headedness and headaches. Psychiatric/Behavioral: Negative for confusion. Except as noted above the remainder of the review of systems was reviewed and negative. PHYSICAL EXAM    (up to 7 for level 4, 8 or more for level 5)     ED Triage Vitals [22 1520]   BP Temp Temp src Pulse Resp SpO2 Height Weight   (!) 202/83 98.8 °F (37.1 °C) -- 113 18 97 % 5' 10\" (1.778 m) 183 lb (83 kg)     Physical Exam  Vitals reviewed. Constitutional:       General: He is not in acute distress. Appearance: He is well-developed. He is not diaphoretic. HENT:      Head: No raccoon eyes or Uriostegui's sign.       Right Ear: External ear normal.      Left Ear: External ear normal.   Eyes:      General: No scleral icterus. Extraocular Movements: Extraocular movements intact. Conjunctiva/sclera: Conjunctivae normal.      Pupils: Pupils are equal, round, and reactive to light. Cardiovascular:      Rate and Rhythm: Normal rate. Pulses: Normal pulses. Pulmonary:      Effort: Pulmonary effort is normal. No respiratory distress. Breath sounds: No stridor. Musculoskeletal:      Cervical back: Tenderness present. No swelling or deformity. Thoracic back: No swelling, deformity, tenderness or bony tenderness. Lumbar back: No swelling, deformity, tenderness or bony tenderness. Comments: moves extremities. Skin:     General: Skin is warm and dry. Capillary Refill: Capillary refill takes less than 2 seconds. Findings: No rash. Neurological:      General: No focal deficit present. Mental Status: He is alert and oriented to person, place, and time. GCS: GCS eye subscore is 4. GCS verbal subscore is 5. GCS motor subscore is 6. Cranial Nerves: Cranial nerves are intact. No cranial nerve deficit. Motor: Motor function is intact. Coordination: Coordination is intact. Gait: Gait is intact. Psychiatric:         Behavior: Behavior normal.         DIAGNOSTIC RESULTS     RADIOLOGY:   Non-plain film images such as CT, Ultrasound and MRI are read by the radiologist. Plain radiographic images are visualized and preliminarily interpreted by the emergency physician with the below findings:    Interpretation per the Radiologist below, if available at the time of this note:    CT CERVICAL SPINE WO CONTRAST    Result Date: 1/24/2022  EXAMINATION: CT OF THE CERVICAL SPINE WITHOUT CONTRAST 1/24/2022 1:05 pm TECHNIQUE: CT of the cervical spine was performed without the administration of intravenous contrast. Multiplanar reformatted images are provided for review.  Dose modulation, iterative reconstruction, and/or weight based adjustment of the mA/kV was utilized to reduce the radiation dose to as low as reasonably achievable. COMPARISON: None. HISTORY: ORDERING SYSTEM PROVIDED HISTORY: fall, pain. N/T bilateral hands, left arm TECHNOLOGIST PROVIDED HISTORY: fall, pain. N/T bilateral hands, left arm Decision Support Exception - unselect if not a suspected or confirmed emergency medical condition->Emergency Medical Condition (MA) Reason for Exam: Pt states he fell today, pain to posterior neck and bilat posterior shoulders. No prior surgery FINDINGS: BONES/ALIGNMENT: Cervical vertebral bodies are maintained in height with straightening of the normal lordosis. Trace age-indeterminate superior endplate height loss at T1 and T2.  Bulky anterior osteophytes at C3 through C7 and T1-T2 with ankylosis at C3 through C5 and C6 through T1. There is ankylosis across the posterior elements at C7-T1. Segments of ossification along the posterior longitudinal ligament at C3 through C7, most significant at C4-C5. There is moderate associated spinal canal stenosis. DEGENERATIVE CHANGES: Mild multilevel disc height loss. Circumferential disc osteophyte complex at C6-C7 with at least partial ankylosis across the disc space. Severe osseous neuroforaminal stenosis bilaterally at C5 through C7. SOFT TISSUES: Prevertebral soft tissues are normal thickness. Thyroid appears within normal limits. Partially visualized portion of the lung apices are clear. No acute cervical spine fracture with trace age-indeterminate superior endplate height loss at T1 and T2. 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 Moderate associated spinal canal stenosis at C3 through C5. Degenerative changes with severe osseous neural foraminal stenosis at C5 through C7.        EMERGENCY DEPARTMENT COURSE and DIFFERENTIAL DIAGNOSIS/MDM:   Vitals:    Vitals:    01/24/22 1520   BP: (!) 202/83   Pulse: 113   Resp: 18   Temp: 98.8 °F (37.1 °C)   SpO2: 97%   Weight: 183 lb (83 kg)   Height: 5' 10\" (1.778 m)       CLINICAL DECISION MAKING:  The patient presented alert with a nontoxic appearance and was seen in conjunction with Dr. Diann Zamora. CT scan was pending. Care was resumed to the physician. See his dictation for further evaluation and treatment. . Care was provided during an unprecedented national emergency due to the novel coronavirus, Covid-19.          (Please note that portions of this note were completed with a voice recognition program.  Efforts were made to edit the dictations but occasionally words are mis-transcribed.)    Hany Witt, [address] [facility], APRN - [facility]  01/25/22 8210

## 2022-01-24 NOTE — ED PROVIDER NOTES
[facility]  EMERGENCY DEPARTMENT ENCOUNTER   ATTENDING ATTESTATION     Pt Name: Jay Mcintosh  MRN: [id number]  [place] 1945  Date of evaluation: 1/24/22       Jay Mcintosh is a 78 y.o. male who presents with Fall (Pt reports fell on ice earlier; c/o neck pain; shoulder pain; denies LOC; not on blood thinners), Neck Pain, and Shoulder Pain      MDM:     62-year-old male presents with complaints of neck and back pain. Patient had a fall on the ice. Patient CT scan shows severe degenerative changes without any acute fracture. He has evidence of a compression fracture at T1-T2, no acute tenderness in this region. Neurologically the patient is intact, no numbness tingling or weakness at this time, he denies any difficulty with movement. Plan is discharged with outpatient follow-up. Impression: Neck contusion    Vitals:   Vitals:    01/24/22 1520   BP: (!) 202/83   Pulse: 113   Resp: 18   Temp: 98.8 °F (37.1 °C)   SpO2: 97%   Weight: 183 lb (83 kg)   Height: 5' 10\" (1.778 m)         This visit was performed by both a physician and an APC. I personally evaluated and examined the patient.  I performed all aspects of the MDM as documented     Michael Rowan MD  Attending Emergency  Physician                  Geovanna Urena MD  01/24/22 9046

## 2022-01-25 ASSESSMENT — ENCOUNTER SYMPTOMS
FACIAL SWELLING: 0
EYE PAIN: 0
BACK PAIN: 0
TROUBLE SWALLOWING: 0
SHORTNESS OF BREATH: 0
NAUSEA: 0
PHOTOPHOBIA: 0
ABDOMINAL PAIN: 0
COLOR CHANGE: 0
VOMITING: 0

## 2022-12-06 ENCOUNTER — HOSPITAL ENCOUNTER (OUTPATIENT)
Age: 77
Setting detail: SPECIMEN
Discharge: HOME | End: 2022-12-06

## 2022-12-06 DIAGNOSIS — I10 ESSENTIAL HYPERTENSION: ICD-10-CM

## 2022-12-06 DIAGNOSIS — N42.9 PROSTATE DISORDER: ICD-10-CM

## 2022-12-06 DIAGNOSIS — E55.9 VITAMIN D DEFICIENCY: ICD-10-CM

## 2022-12-06 DIAGNOSIS — R73.01 IFG (IMPAIRED FASTING GLUCOSE): ICD-10-CM

## 2022-12-06 DIAGNOSIS — E07.9 THYROID DISORDER: ICD-10-CM

## 2022-12-06 DIAGNOSIS — E53.8 B12 DEFICIENCY: ICD-10-CM

## 2022-12-06 DIAGNOSIS — E78.2 MIXED HYPERLIPIDEMIA: ICD-10-CM

## 2022-12-06 LAB
ABSOLUTE EOS #: 0.22 K/UL (ref 0–0.44)
ABSOLUTE IMMATURE GRANULOCYTE: 0.03 K/UL (ref 0–0.3)
ABSOLUTE LYMPH #: 1.44 K/UL (ref 1.1–3.7)
ABSOLUTE MONO #: 0.52 K/UL (ref 0.1–1.2)
ALBUMIN SERPL-MCNC: 4.5 G/DL (ref 3.5–5.2)
ALBUMIN/GLOBULIN RATIO: 1.6 (ref 1–2.5)
ALP BLD-CCNC: 83 U/L (ref 40–129)
ALT SERPL-CCNC: 21 U/L (ref 5–41)
ANION GAP SERPL CALCULATED.3IONS-SCNC: 12 MMOL/L (ref 9–17)
AST SERPL-CCNC: 21 U/L
BASOPHILS # BLD: 1 % (ref 0–2)
BASOPHILS ABSOLUTE: 0.03 K/UL (ref 0–0.2)
BILIRUB SERPL-MCNC: 0.7 MG/DL (ref 0.3–1.2)
BUN BLDV-MCNC: 12 MG/DL (ref 8–23)
CALCIUM SERPL-MCNC: 9.5 MG/DL (ref 8.6–10.4)
CHLORIDE BLD-SCNC: 104 MMOL/L (ref 98–107)
CHOLESTEROL/HDL RATIO: 3.9
CHOLESTEROL: 188 MG/DL
CO2: 26 MMOL/L (ref 20–31)
CREAT SERPL-MCNC: 0.87 MG/DL (ref 0.7–1.2)
EOSINOPHILS RELATIVE PERCENT: 4 % (ref 1–4)
GFR SERPL CREATININE-BSD FRML MDRD: >60 ML/MIN/1.73M2
GLUCOSE FASTING: 97 MG/DL (ref 70–99)
HCT VFR BLD CALC: 46.1 % (ref 40.7–50.3)
HDLC SERPL-MCNC: 48 MG/DL
HEMOGLOBIN: 15.7 G/DL (ref 13–17)
IMMATURE GRANULOCYTES: 1 %
LDL CHOLESTEROL: 114 MG/DL (ref 0–130)
LYMPHOCYTES # BLD: 23 % (ref 24–43)
MCH RBC QN AUTO: 30.5 PG (ref 25.2–33.5)
MCHC RBC AUTO-ENTMCNC: 34.1 G/DL (ref 28.4–34.8)
MCV RBC AUTO: 89.5 FL (ref 82.6–102.9)
MONOCYTES # BLD: 8 % (ref 3–12)
NRBC AUTOMATED: 0 PER 100 WBC
PDW BLD-RTO: 13.5 % (ref 11.8–14.4)
PLATELET # BLD: 221 K/UL (ref 138–453)
PMV BLD AUTO: 10.7 FL (ref 8.1–13.5)
POTASSIUM SERPL-SCNC: 4.2 MMOL/L (ref 3.7–5.3)
PROSTATE SPECIFIC ANTIGEN: 2.82 NG/ML
RBC # BLD: 5.15 M/UL (ref 4.21–5.77)
SEG NEUTROPHILS: 63 % (ref 36–65)
SEGMENTED NEUTROPHILS ABSOLUTE COUNT: 4.05 K/UL (ref 1.5–8.1)
SODIUM BLD-SCNC: 142 MMOL/L (ref 135–144)
TOTAL PROTEIN: 7.4 G/DL (ref 6.4–8.3)
TRIGL SERPL-MCNC: 132 MG/DL
TSH SERPL DL<=0.05 MIU/L-ACNC: 2.56 UIU/ML (ref 0.3–5)
VITAMIN B-12: 802 PG/ML (ref 232–1245)
VITAMIN D 25-HYDROXY: 54.9 NG/ML
WBC # BLD: 6.3 K/UL (ref 3.5–11.3)

## 2022-12-07 LAB — T4 TOTAL: 7.6 UG/DL (ref 4.5–10.9)

## 2024-06-25 ENCOUNTER — HOSPITAL ENCOUNTER (OUTPATIENT)
Age: 79
Setting detail: SPECIMEN
Discharge: HOME | End: 2024-06-25

## 2024-06-25 DIAGNOSIS — E53.8 B12 DEFICIENCY: ICD-10-CM

## 2024-06-25 DIAGNOSIS — E78.2 MIXED HYPERLIPIDEMIA: ICD-10-CM

## 2024-06-25 DIAGNOSIS — E55.9 VITAMIN D DEFICIENCY: ICD-10-CM

## 2024-06-25 DIAGNOSIS — R73.01 IFG (IMPAIRED FASTING GLUCOSE): ICD-10-CM

## 2024-06-25 DIAGNOSIS — R97.20 ELEVATED PSA: ICD-10-CM

## 2024-06-25 DIAGNOSIS — E03.9 HYPOTHYROIDISM, UNSPECIFIED TYPE: ICD-10-CM

## 2024-06-25 LAB
25(OH)D3 SERPL-MCNC: 47.2 NG/ML (ref 30–100)
BASOPHILS # BLD: 0.03 K/UL (ref 0–0.2)
BASOPHILS NFR BLD: 0 % (ref 0–2)
CHOLEST SERPL-MCNC: 159 MG/DL (ref 0–199)
CHOLESTEROL/HDL RATIO: 4
EOSINOPHIL # BLD: 0.14 K/UL (ref 0–0.44)
EOSINOPHILS RELATIVE PERCENT: 2 % (ref 1–4)
ERYTHROCYTE [DISTWIDTH] IN BLOOD BY AUTOMATED COUNT: 12.6 % (ref 11.8–14.4)
HCT VFR BLD AUTO: 44 % (ref 40.7–50.3)
HDLC SERPL-MCNC: 44 MG/DL
HGB BLD-MCNC: 15.6 G/DL (ref 13–17)
IMM GRANULOCYTES # BLD AUTO: 0.04 K/UL (ref 0–0.3)
IMM GRANULOCYTES NFR BLD: 0 %
LDLC SERPL CALC-MCNC: 92 MG/DL (ref 0–100)
LYMPHOCYTES NFR BLD: 1.21 K/UL (ref 1.1–3.7)
LYMPHOCYTES RELATIVE PERCENT: 14 % (ref 24–43)
MCH RBC QN AUTO: 30.3 PG (ref 25.2–33.5)
MCHC RBC AUTO-ENTMCNC: 35.5 G/DL (ref 28.4–34.8)
MCV RBC AUTO: 85.4 FL (ref 82.6–102.9)
MONOCYTES NFR BLD: 0.64 K/UL (ref 0.1–1.2)
MONOCYTES NFR BLD: 7 % (ref 3–12)
NEUTROPHILS NFR BLD: 77 % (ref 36–65)
NEUTS SEG NFR BLD: 6.88 K/UL (ref 1.5–8.1)
NRBC BLD-RTO: 0 PER 100 WBC
PLATELET # BLD AUTO: 211 K/UL (ref 138–453)
PMV BLD AUTO: 10.4 FL (ref 8.1–13.5)
PSA SERPL-MCNC: 2.9 NG/ML (ref 0–4)
RBC # BLD AUTO: 5.15 M/UL (ref 4.21–5.77)
T4 SERPL-MCNC: 7.8 UG/DL (ref 4.5–11.7)
TRIGL SERPL-MCNC: 115 MG/DL
TSH SERPL DL<=0.05 MIU/L-ACNC: 0.91 UIU/ML (ref 0.27–4.2)
VIT B12 SERPL-MCNC: 749 PG/ML (ref 232–1245)
VLDLC SERPL CALC-MCNC: 23 MG/DL
WBC OTHER # BLD: 8.9 K/UL (ref 3.5–11.3)

## 2024-06-26 LAB
ALBUMIN SERPL-MCNC: 4.8 G/DL (ref 3.5–5.2)
ALBUMIN/GLOB SERPL: 2 {RATIO} (ref 1–2.5)
ALP SERPL-CCNC: 86 U/L (ref 40–129)
ALT SERPL-CCNC: 24 U/L (ref 10–50)
ANION GAP SERPL CALCULATED.3IONS-SCNC: 12 MMOL/L (ref 9–16)
AST SERPL-CCNC: 26 U/L (ref 10–50)
BILIRUB SERPL-MCNC: 0.7 MG/DL (ref 0–1.2)
BUN SERPL-MCNC: 15 MG/DL (ref 8–23)
CALCIUM SERPL-MCNC: 9.7 MG/DL (ref 8.6–10.4)
CHLORIDE SERPL-SCNC: 106 MMOL/L (ref 98–107)
CO2 SERPL-SCNC: 24 MMOL/L (ref 20–31)
CREAT SERPL-MCNC: 1 MG/DL (ref 0.7–1.2)
GFR, ESTIMATED: 78 ML/MIN/1.73M2
GLUCOSE P FAST SERPL-MCNC: 97 MG/DL (ref 74–99)
POTASSIUM SERPL-SCNC: 5.1 MMOL/L (ref 3.7–5.3)
PROT SERPL-MCNC: 7.2 G/DL (ref 6.6–8.7)
SODIUM SERPL-SCNC: 142 MMOL/L (ref 136–145)

## 2025-04-12 ENCOUNTER — APPOINTMENT (OUTPATIENT)
Dept: GENERAL RADIOLOGY | Age: 80
End: 2025-04-12
Payer: MEDICARE

## 2025-04-12 ENCOUNTER — HOSPITAL ENCOUNTER (OUTPATIENT)
Age: 80
Setting detail: OBSERVATION
Discharge: HOME | End: 2025-04-14
Attending: EMERGENCY MEDICINE | Admitting: HOSPITALIST
Payer: MEDICARE

## 2025-04-12 DIAGNOSIS — R07.9 CHEST PAIN, UNSPECIFIED TYPE: Primary | ICD-10-CM

## 2025-04-12 DIAGNOSIS — E83.39 HYPOPHOSPHATEMIA: ICD-10-CM

## 2025-04-12 DIAGNOSIS — I20.9 ANGINA PECTORIS: ICD-10-CM

## 2025-04-12 LAB
ANION GAP SERPL CALCULATED.3IONS-SCNC: 10 MMOL/L (ref 9–16)
BASOPHILS # BLD: <0.03 K/UL (ref 0–0.2)
BASOPHILS NFR BLD: 0 % (ref 0–2)
BUN SERPL-MCNC: 15 MG/DL (ref 8–23)
CALCIUM SERPL-MCNC: 9.3 MG/DL (ref 8.8–10.2)
CHLORIDE SERPL-SCNC: 106 MMOL/L (ref 98–107)
CO2 SERPL-SCNC: 24 MMOL/L (ref 20–31)
CREAT SERPL-MCNC: 0.9 MG/DL (ref 0.7–1.2)
EOSINOPHIL # BLD: 0.13 K/UL (ref 0–0.44)
EOSINOPHILS RELATIVE PERCENT: 2 % (ref 1–4)
ERYTHROCYTE [DISTWIDTH] IN BLOOD BY AUTOMATED COUNT: 12.7 % (ref 11.8–14.4)
GFR, ESTIMATED: 85 ML/MIN/1.73M2
GLUCOSE SERPL-MCNC: 129 MG/DL (ref 82–115)
HCT VFR BLD AUTO: 44 % (ref 40.7–50.3)
HGB BLD-MCNC: 16 G/DL (ref 13–17)
IMM GRANULOCYTES # BLD AUTO: 0.05 K/UL (ref 0–0.3)
IMM GRANULOCYTES NFR BLD: 1 %
INR PPP: 1
LYMPHOCYTES NFR BLD: 0.92 K/UL (ref 1.1–3.7)
LYMPHOCYTES RELATIVE PERCENT: 14 % (ref 24–43)
MAGNESIUM SERPL-MCNC: 2.3 MG/DL (ref 1.6–2.4)
MCH RBC QN AUTO: 30.7 PG (ref 25.2–33.5)
MCHC RBC AUTO-ENTMCNC: 36.4 G/DL (ref 28.4–34.8)
MCV RBC AUTO: 84.5 FL (ref 82.6–102.9)
MONOCYTES NFR BLD: 0.43 K/UL (ref 0.1–1.2)
MONOCYTES NFR BLD: 7 % (ref 3–12)
NEUTROPHILS NFR BLD: 76 % (ref 36–65)
NEUTS SEG NFR BLD: 4.91 K/UL (ref 1.5–8.1)
NRBC BLD-RTO: 0 PER 100 WBC
PARTIAL THROMBOPLASTIN TIME: 27.2 SEC (ref 23.9–33.8)
PHOSPHATE SERPL-MCNC: 0.9 MG/DL (ref 2.5–4.5)
PHOSPHATE SERPL-MCNC: 3.9 MG/DL (ref 2.5–4.5)
PLATELET # BLD AUTO: 231 K/UL (ref 138–453)
PMV BLD AUTO: 9.8 FL (ref 8.1–13.5)
POTASSIUM SERPL-SCNC: 3.8 MMOL/L (ref 3.7–5.3)
PROTHROMBIN TIME: 13.4 SEC (ref 11.5–14.2)
RBC # BLD AUTO: 5.21 M/UL (ref 4.21–5.77)
SODIUM SERPL-SCNC: 140 MMOL/L (ref 136–145)
TROPONIN I SERPL HS-MCNC: 19 NG/L (ref 0–22)
TROPONIN I SERPL HS-MCNC: 20 NG/L (ref 0–22)
WBC OTHER # BLD: 6.5 K/UL (ref 3.5–11.3)

## 2025-04-12 PROCEDURE — 71045 X-RAY EXAM CHEST 1 VIEW: CPT

## 2025-04-12 PROCEDURE — 96372 THER/PROPH/DIAG INJ SC/IM: CPT

## 2025-04-12 PROCEDURE — 99222 1ST HOSP IP/OBS MODERATE 55: CPT | Performed by: INTERNAL MEDICINE

## 2025-04-12 PROCEDURE — 85025 COMPLETE CBC W/AUTO DIFF WBC: CPT

## 2025-04-12 PROCEDURE — 80048 BASIC METABOLIC PNL TOTAL CA: CPT

## 2025-04-12 PROCEDURE — 6370000000 HC RX 637 (ALT 250 FOR IP): Performed by: EMERGENCY MEDICINE

## 2025-04-12 PROCEDURE — 96366 THER/PROPH/DIAG IV INF ADDON: CPT

## 2025-04-12 PROCEDURE — 96365 THER/PROPH/DIAG IV INF INIT: CPT

## 2025-04-12 PROCEDURE — 6370000000 HC RX 637 (ALT 250 FOR IP): Performed by: INTERNAL MEDICINE

## 2025-04-12 PROCEDURE — 83735 ASSAY OF MAGNESIUM: CPT

## 2025-04-12 PROCEDURE — 84100 ASSAY OF PHOSPHORUS: CPT

## 2025-04-12 PROCEDURE — 99285 EMERGENCY DEPT VISIT HI MDM: CPT

## 2025-04-12 PROCEDURE — 2580000003 HC RX 258: Performed by: EMERGENCY MEDICINE

## 2025-04-12 PROCEDURE — G0378 HOSPITAL OBSERVATION PER HR: HCPCS

## 2025-04-12 PROCEDURE — 84484 ASSAY OF TROPONIN QUANT: CPT

## 2025-04-12 PROCEDURE — 93005 ELECTROCARDIOGRAM TRACING: CPT | Performed by: EMERGENCY MEDICINE

## 2025-04-12 PROCEDURE — 2500000003 HC RX 250 WO HCPCS: Performed by: EMERGENCY MEDICINE

## 2025-04-12 PROCEDURE — 36415 COLL VENOUS BLD VENIPUNCTURE: CPT

## 2025-04-12 PROCEDURE — 6360000002 HC RX W HCPCS

## 2025-04-12 PROCEDURE — 99222 1ST HOSP IP/OBS MODERATE 55: CPT

## 2025-04-12 PROCEDURE — 6370000000 HC RX 637 (ALT 250 FOR IP)

## 2025-04-12 PROCEDURE — 85610 PROTHROMBIN TIME: CPT

## 2025-04-12 PROCEDURE — 85730 THROMBOPLASTIN TIME PARTIAL: CPT

## 2025-04-12 RX ORDER — ONDANSETRON 4 MG/1
4 TABLET, ORALLY DISINTEGRATING ORAL EVERY 8 HOURS PRN
Status: DISCONTINUED | OUTPATIENT
Start: 2025-04-12 | End: 2025-04-14 | Stop reason: HOSPADM

## 2025-04-12 RX ORDER — SODIUM CHLORIDE 0.9 % (FLUSH) 0.9 %
5-40 SYRINGE (ML) INJECTION EVERY 12 HOURS SCHEDULED
Status: DISCONTINUED | OUTPATIENT
Start: 2025-04-12 | End: 2025-04-14 | Stop reason: HOSPADM

## 2025-04-12 RX ORDER — METOPROLOL TARTRATE 1 MG/ML
5 INJECTION, SOLUTION INTRAVENOUS EVERY 5 MIN PRN
Status: CANCELLED | OUTPATIENT
Start: 2025-04-12 | End: 2025-04-13

## 2025-04-12 RX ORDER — TIMOLOL MALEATE 5 MG/ML
1 SOLUTION/ DROPS OPHTHALMIC 2 TIMES DAILY
Status: DISCONTINUED | OUTPATIENT
Start: 2025-04-12 | End: 2025-04-14 | Stop reason: HOSPADM

## 2025-04-12 RX ORDER — ATORVASTATIN CALCIUM 10 MG/1
10 TABLET, FILM COATED ORAL NIGHTLY
Status: DISCONTINUED | OUTPATIENT
Start: 2025-04-12 | End: 2025-04-14 | Stop reason: HOSPADM

## 2025-04-12 RX ORDER — SODIUM CHLORIDE 0.9 % (FLUSH) 0.9 %
5-40 SYRINGE (ML) INJECTION PRN
Status: CANCELLED | OUTPATIENT
Start: 2025-04-12 | End: 2025-04-13

## 2025-04-12 RX ORDER — ACETAMINOPHEN 325 MG/1
650 TABLET ORAL EVERY 6 HOURS PRN
Status: DISCONTINUED | OUTPATIENT
Start: 2025-04-12 | End: 2025-04-14 | Stop reason: HOSPADM

## 2025-04-12 RX ORDER — ENOXAPARIN SODIUM 100 MG/ML
40 INJECTION SUBCUTANEOUS DAILY
Status: DISCONTINUED | OUTPATIENT
Start: 2025-04-12 | End: 2025-04-14 | Stop reason: HOSPADM

## 2025-04-12 RX ORDER — SODIUM CHLORIDE 0.9 % (FLUSH) 0.9 %
5-40 SYRINGE (ML) INJECTION PRN
Status: DISCONTINUED | OUTPATIENT
Start: 2025-04-12 | End: 2025-04-14 | Stop reason: HOSPADM

## 2025-04-12 RX ORDER — SODIUM CHLORIDE 9 MG/ML
INJECTION, SOLUTION INTRAVENOUS PRN
Status: DISCONTINUED | OUTPATIENT
Start: 2025-04-12 | End: 2025-04-14 | Stop reason: HOSPADM

## 2025-04-12 RX ORDER — POTASSIUM CHLORIDE 7.45 MG/ML
10 INJECTION INTRAVENOUS PRN
Status: DISCONTINUED | OUTPATIENT
Start: 2025-04-12 | End: 2025-04-14 | Stop reason: HOSPADM

## 2025-04-12 RX ORDER — LATANOPROST 50 UG/ML
1 SOLUTION/ DROPS OPHTHALMIC NIGHTLY
Status: DISCONTINUED | OUTPATIENT
Start: 2025-04-12 | End: 2025-04-14 | Stop reason: HOSPADM

## 2025-04-12 RX ORDER — POTASSIUM CHLORIDE 1500 MG/1
40 TABLET, EXTENDED RELEASE ORAL PRN
Status: DISCONTINUED | OUTPATIENT
Start: 2025-04-12 | End: 2025-04-14 | Stop reason: HOSPADM

## 2025-04-12 RX ORDER — CARVEDILOL 12.5 MG/1
12.5 TABLET ORAL 2 TIMES DAILY WITH MEALS
Status: DISCONTINUED | OUTPATIENT
Start: 2025-04-12 | End: 2025-04-14 | Stop reason: HOSPADM

## 2025-04-12 RX ORDER — ASPIRIN 81 MG/1
81 TABLET ORAL DAILY
Status: DISCONTINUED | OUTPATIENT
Start: 2025-04-13 | End: 2025-04-14 | Stop reason: HOSPADM

## 2025-04-12 RX ORDER — DORZOLAMIDE HYDROCHLORIDE AND TIMOLOL MALEATE 20; 5 MG/ML; MG/ML
1 SOLUTION/ DROPS OPHTHALMIC 2 TIMES DAILY
Status: DISCONTINUED | OUTPATIENT
Start: 2025-04-12 | End: 2025-04-12 | Stop reason: CLARIF

## 2025-04-12 RX ORDER — NITROGLYCERIN 0.4 MG/1
0.4 TABLET SUBLINGUAL EVERY 5 MIN PRN
Status: CANCELLED | OUTPATIENT
Start: 2025-04-12 | End: 2025-04-13

## 2025-04-12 RX ORDER — ASPIRIN 81 MG/1
162 TABLET, CHEWABLE ORAL ONCE
Status: COMPLETED | OUTPATIENT
Start: 2025-04-12 | End: 2025-04-12

## 2025-04-12 RX ORDER — METOPROLOL TARTRATE 25 MG/1
25 TABLET, FILM COATED ORAL 2 TIMES DAILY
Status: DISCONTINUED | OUTPATIENT
Start: 2025-04-12 | End: 2025-04-12

## 2025-04-12 RX ORDER — REGADENOSON 0.08 MG/ML
0.4 INJECTION, SOLUTION INTRAVENOUS
Status: CANCELLED | OUTPATIENT
Start: 2025-04-12

## 2025-04-12 RX ORDER — ALBUTEROL SULFATE 90 UG/1
2 INHALANT RESPIRATORY (INHALATION) PRN
Status: CANCELLED | OUTPATIENT
Start: 2025-04-12 | End: 2025-04-13

## 2025-04-12 RX ORDER — MAGNESIUM SULFATE IN WATER 40 MG/ML
2000 INJECTION, SOLUTION INTRAVENOUS PRN
Status: DISCONTINUED | OUTPATIENT
Start: 2025-04-12 | End: 2025-04-14 | Stop reason: HOSPADM

## 2025-04-12 RX ORDER — ONDANSETRON 2 MG/ML
4 INJECTION INTRAMUSCULAR; INTRAVENOUS EVERY 6 HOURS PRN
Status: DISCONTINUED | OUTPATIENT
Start: 2025-04-12 | End: 2025-04-14 | Stop reason: HOSPADM

## 2025-04-12 RX ORDER — POLYETHYLENE GLYCOL 3350 17 G/17G
17 POWDER, FOR SOLUTION ORAL DAILY PRN
Status: DISCONTINUED | OUTPATIENT
Start: 2025-04-12 | End: 2025-04-14 | Stop reason: HOSPADM

## 2025-04-12 RX ORDER — POTASSIUM CHLORIDE 1500 MG/1
20 TABLET, EXTENDED RELEASE ORAL ONCE
Status: COMPLETED | OUTPATIENT
Start: 2025-04-12 | End: 2025-04-12

## 2025-04-12 RX ORDER — ACETAMINOPHEN 650 MG/1
650 SUPPOSITORY RECTAL EVERY 6 HOURS PRN
Status: DISCONTINUED | OUTPATIENT
Start: 2025-04-12 | End: 2025-04-14 | Stop reason: HOSPADM

## 2025-04-12 RX ORDER — DORZOLAMIDE HCL 20 MG/ML
1 SOLUTION/ DROPS OPHTHALMIC 2 TIMES DAILY
Status: DISCONTINUED | OUTPATIENT
Start: 2025-04-12 | End: 2025-04-14 | Stop reason: HOSPADM

## 2025-04-12 RX ORDER — AMINOPHYLLINE 25 MG/ML
50 INJECTION, SOLUTION INTRAVENOUS PRN
Status: CANCELLED | OUTPATIENT
Start: 2025-04-12 | End: 2025-04-13

## 2025-04-12 RX ORDER — SODIUM CHLORIDE 9 MG/ML
500 INJECTION, SOLUTION INTRAVENOUS CONTINUOUS PRN
Status: CANCELLED | OUTPATIENT
Start: 2025-04-12 | End: 2025-04-13

## 2025-04-12 RX ORDER — ATROPINE SULFATE 0.1 MG/ML
0.5 INJECTION INTRAVENOUS EVERY 5 MIN PRN
Status: CANCELLED | OUTPATIENT
Start: 2025-04-12 | End: 2025-04-13

## 2025-04-12 RX ADMIN — POTASSIUM CHLORIDE 20 MEQ: 1500 TABLET, EXTENDED RELEASE ORAL at 20:05

## 2025-04-12 RX ADMIN — ASPIRIN 162 MG: 81 TABLET, CHEWABLE ORAL at 15:17

## 2025-04-12 RX ADMIN — LATANOPROST 1 DROP: 50 SOLUTION OPHTHALMIC at 20:05

## 2025-04-12 RX ADMIN — ENOXAPARIN SODIUM 40 MG: 100 INJECTION SUBCUTANEOUS at 18:55

## 2025-04-12 RX ADMIN — SODIUM PHOSPHATE, MONOBASIC, MONOHYDRATE AND SODIUM PHOSPHATE, DIBASIC, ANHYDROUS 26.1 MMOL: 142; 276 INJECTION, SOLUTION INTRAVENOUS at 17:00

## 2025-04-12 RX ADMIN — DORZOLAMIDE HYDROCHLORIDE 1 DROP: 20 SOLUTION/ DROPS OPHTHALMIC at 20:05

## 2025-04-12 RX ADMIN — TIMOLOL MALEATE 1 DROP: 5 SOLUTION OPHTHALMIC at 20:05

## 2025-04-12 RX ADMIN — CARVEDILOL 12.5 MG: 12.5 TABLET, FILM COATED ORAL at 20:05

## 2025-04-12 RX ADMIN — ATORVASTATIN CALCIUM 10 MG: 10 TABLET, FILM COATED ORAL at 20:05

## 2025-04-12 ASSESSMENT — PAIN - FUNCTIONAL ASSESSMENT: PAIN_FUNCTIONAL_ASSESSMENT: 0-10

## 2025-04-12 ASSESSMENT — HEART SCORE: ECG: NORMAL

## 2025-04-12 ASSESSMENT — PAIN SCALES - GENERAL
PAINLEVEL_OUTOF10: 9
PAINLEVEL_OUTOF10: 7
PAINLEVEL_OUTOF10: 0

## 2025-04-12 NOTE — ED NOTES
Pt presenting to the ED with complaints of chest pain radiating into left arm. PT reports that this has been going on since this AM. Pt reports that he had a stent placed in the past.

## 2025-04-12 NOTE — PLAN OF CARE
Problem: Discharge Planning  Goal: Discharge to home or other facility with appropriate resources  4/12/2025 1936 by Alejandra Russ, RN  Outcome: Progressing  4/12/2025 1936 by Alejandra Russ RN  Outcome: Progressing     Problem: Pain  Goal: Verbalizes/displays adequate comfort level or baseline comfort level  4/12/2025 1936 by Alejandra Russ RN  Outcome: Progressing  4/12/2025 1936 by Alejandra Russ RN  Outcome: Progressing     Problem: Safety - Adult  Goal: Free from fall injury  4/12/2025 1936 by Alejandra Russ, RN  Outcome: Progressing  4/12/2025 1936 by Alejandra Russ, RN  Outcome: Progressing

## 2025-04-12 NOTE — CONSULTS
106 04/12/2025 02:55 PM    CO2 24 04/12/2025 02:55 PM    BUN 15 04/12/2025 02:55 PM    CREATININE 0.9 04/12/2025 02:55 PM    GFRAA >60 12/06/2021 12:40 PM    LABGLOM 85 04/12/2025 02:55 PM    LABGLOM >60 06/14/2023 09:15 AM    GLUCOSE 129 04/12/2025 02:55 PM    CALCIUM 9.3 04/12/2025 02:55 PM     Magnesium:    Lab Results   Component Value Date/Time    MG 2.3 04/12/2025 02:55 PM     PTT:    Lab Results   Component Value Date/Time    APTT 27.2 04/12/2025 02:55 PM     TSH:    Lab Results   Component Value Date/Time    TSH 0.91 06/25/2024 12:00 PM     BNP: No results for input(s): \"BNP\", \"PROBNP\" in the last 72 hours.  BMP:  Lab Results   Component Value Date/Time     04/12/2025 02:55 PM    K 3.8 04/12/2025 02:55 PM     04/12/2025 02:55 PM    CO2 24 04/12/2025 02:55 PM    BUN 15 04/12/2025 02:55 PM    CREATININE 0.9 04/12/2025 02:55 PM    CALCIUM 9.3 04/12/2025 02:55 PM    GFRAA >60 12/06/2021 12:40 PM    LABGLOM 85 04/12/2025 02:55 PM    LABGLOM >60 06/14/2023 09:15 AM    GLUCOSE 129 04/12/2025 02:55 PM     LIVER PROFILE:No results for input(s): \"AST\", \"ALT\", \"LABALBU\", \"ALKPHOS\", \"BILITOT\", \"BILIDIR\", \"IBILI\", \"GLOB\", \"ALBUMIN\" in the last 72 hours.    Invalid input(s): \"PROT\"  FLP:        Component Value Date/Time    CHOL 159 06/25/2024 1200    HDL 44 06/25/2024 1200    LDL 92 06/25/2024 1200    LDL 90 06/25/2024 0000    TRIG 115 06/25/2024 1200     HGA1C:  No results for input(s): \"LABA1C\" in the last 720 hours.       ASSESSMENT/PLAN:  1. Atherosclerotic coronary artery disease s/p remote intervention now with chest pain   -EKG and cardiac biomarkers have been unremarkable  -we will perform nuclear stress test   -continue aspirin 81 mg daily  -replace and keep potassium more than 4 and magnesium more than 2    2. Dyslipidemia; start low-dose atorvastatin    3. Essential hypertension  -switch metoprolol to carvedilol for better blood pressure control  -follow echocardiogram results    4. Glaucoma,

## 2025-04-12 NOTE — ED NOTES
ED to inpatient nurses report     Admitting Diagnosis: Chest pain,  Chief Complaint   Patient presents with    Chest Pain     Mid-sternal into left arm       Present to ED from Home. Pt has been having chest pain mid sternal into left arm  LOC: alert and orientated to name, place, date  Patient confused: no  Vital signs   Vitals:    04/12/25 1642 04/12/25 1643 04/12/25 1644 04/12/25 1715   BP:    (!) 141/72   Pulse: 68 70 64 65   Resp:       Temp:       TempSrc:       SpO2: 94% 93% 94% 93%   Weight:       Height:          Oxygen Baseline room air     Current needs required no   SEPSIS: no  [no] Lactate X 2 ordered (Yes or No)  [no] Antibiotics given (Yes or No)  [no] IV Fluids ordered (Yes or No)             [no] 2nd IV completed (Yes or No)  [no] Hourly Vital Signs (Validated)  [no] Outstanding Orders:     LDAs:   Peripheral IV 04/12/25 Distal;Right;Anterior Cephalic (Active)     Mobility: Independent  Fall Risk:    Outstanding ED orders: None    Consults: IP CONSULT TO INTERNAL MEDICINE  IP CONSULT TO CARDIOLOGY  []  Hospitalist  Completed  [] yes [] no Who:   []  Medicine  Completed  [] yes [] No Who:   []  Cardiology  Completed  [] yes [] No Who:   []  GI   Completed  [] yes [] No Who:   []  Neurology  Completed  [] yes [] No Who:   []  Nephrology Completed  [] yes [] No Who:    []  Vascular  Completed  [] yes [] No Who:   []  Ortho  Completed  [] yes [] No Who:     []  Surgery  Completed  [] yes [] No Who:    []  Urology  Completed  [] yes [] No Who:    []  CT Surgery Completed  [] yes [] No Who:   []  Podiatry  Completed  [] yes [] No Who:    []  Other    Completed  [] yes [] No Who:    Situation and Background: Pt reports hs of stents, has not visit cardiologist in \"years\"     Interventions and Important Events: Phos 0.9, IV replacement              *Effective communication is essential throughout this process. It is important for the nurse to document the progress of the med rec to keep team members

## 2025-04-12 NOTE — ED PROVIDER NOTES
Exam  Constitutional:       General: He is not in acute distress.     Appearance: Normal appearance.   HENT:      Head: Normocephalic and atraumatic.      Right Ear: External ear normal.      Left Ear: External ear normal.      Nose: Nose normal. No congestion or rhinorrhea.   Eyes:      Extraocular Movements: Extraocular movements intact.      Pupils: Pupils are equal, round, and reactive to light.   Cardiovascular:      Rate and Rhythm: Normal rate and regular rhythm.      Pulses: Normal pulses.      Heart sounds: Normal heart sounds.   Pulmonary:      Effort: Pulmonary effort is normal. No respiratory distress.      Breath sounds: Normal breath sounds.   Abdominal:      General: Bowel sounds are normal.      Palpations: Abdomen is soft.      Tenderness: There is no abdominal tenderness.   Musculoskeletal:         General: No deformity. Normal range of motion.      Cervical back: Normal range of motion. No rigidity.   Skin:     General: Skin is warm and dry.   Neurological:      General: No focal deficit present.      Mental Status: He is alert and oriented to person, place, and time. Mental status is at baseline.   Psychiatric:         Mood and Affect: Mood normal.         Behavior: Behavior normal.         MEDICAL DECISION MAKING / ED COURSE:         1)  Number and Complexity of Problems Addressed at this Encounter      2)  Data Reviewed and Analyzed  (Lab and radiology tests/orders below in next section)    History: 1  EC  Patient Age: 4  Risk Factors: 2  Troponin: 0  Heart Score Total: 5          3)  Treatment and Disposition         Patient presents to the ER complaining of chest pain.  Cardiac workup in the ER did not display positive signs of acute cardiac ischemia.  EKG was reviewed and interpreted by myself, the ED physician.  Patient with hypophosphatemia phosphorus replacement was started in the ER.  The patient does have a significant history of CAD.  Patient was admitted after speaking with the

## 2025-04-12 NOTE — FLOWSHEET NOTE
04/12/25 1948   Treatment Team Notification   Reason for Communication Evaluate   Name of Team Member Notified Dr. Sethi   Treatment Team Role Consulting Provider   Method of Communication Face to face   Response At bedside     Dr. Sethi at bedside with RN. Went over medical history and social history. Order for 20meq of K one time.

## 2025-04-12 NOTE — H&P
Marleny Guzman is a 77 y.o.   female patient, who presents for a 6 minute walk test ordered by MD Nestor.  The diagnosis is Pulmonary Hypertension; Connective Tissue Disease.  The patient's BMI is 29.9 kg/m2.  Predicted distance (lower limit of normal) is 254.17 meters.      Test Results:    The test was completed without stopping.  The total time walked was 360 seconds.  During walking, the patient reported:  Dyspnea, Lightheadedness.  The patient used no assistive devices during testing.     01/13/2025---------Distance: 335.28 meters (1100 feet)     O2 Sat % Supplemental Oxygen Heart Rate Blood Pressure Juancarlos Scale   Pre-exercise  (Resting) 97 % Room Air 74 bpm 157/68 mmHg 5-6   During Exercise 95 % Room Air 81 bpm 185/63 mmHg 7-8   Post-exercise  (Recovery) 95 % Room Air  77 bpm       Recovery Time: 52 seconds    Performing nurse/tech: Carson RETANA      PREVIOUS STUDY:   10/16/2024---------Distance: 304.8 meters (1000 feet)       O2 Sat % Supplemental Oxygen Heart Rate Blood Pressure Juancarlos Scale   Pre-exercise  (Resting) 98 % Room Air 62 bpm 145/77 mmHg 0   During Exercise 97 % Room Air 72 bpm 150/80 mmHg 1   Post-exercise  (Recovery) 98 % Room Air  62 bpm           CLINICAL INTERPRETATION:  Six minute walk distance is 335.28 meters (1100 feet) with very heavy dyspnea.  During exercise, there was no significant desaturation while breathing room air.  Blood pressure increased significantly and Heart rate remained stable with walking.  Hypertension was present prior to exercise.  The patient reported non-pulmonary symptoms during exercise.  Since the previous study in October 2024, exercise capacity may be somewhat improved.  Based upon age and body mass index, exercise capacity is normal.   [facility]  Office: [phone]  Christian Troy DO, Mauricio Tinoco DO, Dinesh Jiménez DO, Ortega Thomas DO, Diomedes Galan MD, Paula Crowe MD, Yoshi Bernabe MD, Shania Montgomery MD,  Arnaldo Guzman MD, Bowen Mims MD, Adri Wayne MD,  Merlene Granda DO, Sergio Zaidi MD, Delmer Ho MD, Joel Troy DO, Radha Brooks MD,  Ernst Anderson DO, Mona Flores MD, Elvira Garza MD, Guille Westbrook MD,  Severino Mcmillan MD, Gregorio Kapadia MD, Jaime Colón MD, Leodan Liang MD, Gonsalo Mario MD, Mikaela Snowden MD, Rodo Gardner DO, Suze Zambrano MD, Yao Arguello MD, Merlene Ramirez MD, Mohsin Reza, MD, Wesley Wang MD, Shirley Waterhouse, CNP,  Rand Koroma, CNP, Rodo Perales, CNP,  Ayleen Alberts, DNP, Christa Che, CNP, Lizett Land, CNP, Swapna Guerra, CNP, Danielle Sofia, CNP, Meli Romeo, PA-C, Yvonne Allan, CNP, Lesly Schilling, CNP,  Becca Sun, CNP, Beatrice Allen, CNP, Dennis Michel, PA-C, Mariann Mcdowell, CNP,  Andria Cox, CNS, Aicha Anand, CNP, Noemi Tracy, CNP,   Trinity Mac, CNP         [facility]   IN-PATIENT SERVICE   [facility]    HISTORY AND PHYSICAL EXAMINATION            Date:   4/12/2025  Patient name:  Joel Guzmán  Date of admission:  4/12/2025  2:51 PM  MRN:   [id number]  Account:  [id number]  YOB: 1945  PCP:    No primary care provider on file.  Room:   [place] [address]  Code Status:    Prior    Chief Complaint:     Chief Complaint   Patient presents with    Chest Pain     Mid-sternal into left arm      History Obtained From:     patient, electronic medical record    History of Present Illness:     Joel Guzmán is a 80 y.o. Non- / non  male who presents with Chest Pain (Mid-sternal into left arm )   and is admitted to the hospital for the management of Hypophosphatemia.    This 80-year-old male is admitted for management of hypophosphatemia.  He initially presented to this

## 2025-04-13 LAB
ANION GAP SERPL CALCULATED.3IONS-SCNC: 8 MMOL/L (ref 9–16)
BUN SERPL-MCNC: 15 MG/DL (ref 8–23)
CALCIUM SERPL-MCNC: 9.1 MG/DL (ref 8.8–10.2)
CHLORIDE SERPL-SCNC: 107 MMOL/L (ref 98–107)
CO2 SERPL-SCNC: 27 MMOL/L (ref 20–31)
CREAT SERPL-MCNC: 0.8 MG/DL (ref 0.7–1.2)
GFR, ESTIMATED: 90 ML/MIN/1.73M2
GLUCOSE SERPL-MCNC: 95 MG/DL (ref 82–115)
PHOSPHATE SERPL-MCNC: 2.9 MG/DL (ref 2.5–4.5)
PHOSPHATE SERPL-MCNC: 3.6 MG/DL (ref 2.5–4.5)
POTASSIUM SERPL-SCNC: 3.9 MMOL/L (ref 3.7–5.3)
SODIUM SERPL-SCNC: 142 MMOL/L (ref 136–145)

## 2025-04-13 PROCEDURE — G0378 HOSPITAL OBSERVATION PER HR: HCPCS

## 2025-04-13 PROCEDURE — 94761 N-INVAS EAR/PLS OXIMETRY MLT: CPT

## 2025-04-13 PROCEDURE — 6370000000 HC RX 637 (ALT 250 FOR IP): Performed by: INTERNAL MEDICINE

## 2025-04-13 PROCEDURE — 97530 THERAPEUTIC ACTIVITIES: CPT

## 2025-04-13 PROCEDURE — 99231 SBSQ HOSP IP/OBS SF/LOW 25: CPT | Performed by: HOSPITALIST

## 2025-04-13 PROCEDURE — 2500000003 HC RX 250 WO HCPCS

## 2025-04-13 PROCEDURE — 96372 THER/PROPH/DIAG INJ SC/IM: CPT

## 2025-04-13 PROCEDURE — 84100 ASSAY OF PHOSPHORUS: CPT

## 2025-04-13 PROCEDURE — 6370000000 HC RX 637 (ALT 250 FOR IP)

## 2025-04-13 PROCEDURE — 80048 BASIC METABOLIC PNL TOTAL CA: CPT

## 2025-04-13 PROCEDURE — 97535 SELF CARE MNGMENT TRAINING: CPT

## 2025-04-13 PROCEDURE — 99232 SBSQ HOSP IP/OBS MODERATE 35: CPT | Performed by: INTERNAL MEDICINE

## 2025-04-13 PROCEDURE — 36415 COLL VENOUS BLD VENIPUNCTURE: CPT

## 2025-04-13 PROCEDURE — 97161 PT EVAL LOW COMPLEX 20 MIN: CPT

## 2025-04-13 PROCEDURE — 6360000002 HC RX W HCPCS

## 2025-04-13 PROCEDURE — 97116 GAIT TRAINING THERAPY: CPT

## 2025-04-13 RX ADMIN — CARVEDILOL 12.5 MG: 12.5 TABLET, FILM COATED ORAL at 08:20

## 2025-04-13 RX ADMIN — SODIUM CHLORIDE, PRESERVATIVE FREE 10 ML: 5 INJECTION INTRAVENOUS at 09:32

## 2025-04-13 RX ADMIN — LATANOPROST 1 DROP: 50 SOLUTION OPHTHALMIC at 19:29

## 2025-04-13 RX ADMIN — CARVEDILOL 12.5 MG: 12.5 TABLET, FILM COATED ORAL at 17:03

## 2025-04-13 RX ADMIN — DORZOLAMIDE HYDROCHLORIDE 1 DROP: 20 SOLUTION/ DROPS OPHTHALMIC at 19:28

## 2025-04-13 RX ADMIN — ENOXAPARIN SODIUM 40 MG: 100 INJECTION SUBCUTANEOUS at 19:28

## 2025-04-13 RX ADMIN — ATORVASTATIN CALCIUM 10 MG: 10 TABLET, FILM COATED ORAL at 19:28

## 2025-04-13 RX ADMIN — ASPIRIN 81 MG: 81 TABLET, COATED ORAL at 08:20

## 2025-04-13 RX ADMIN — SODIUM CHLORIDE, PRESERVATIVE FREE 10 ML: 5 INJECTION INTRAVENOUS at 19:33

## 2025-04-13 RX ADMIN — TIMOLOL MALEATE 1 DROP: 5 SOLUTION OPHTHALMIC at 08:21

## 2025-04-13 RX ADMIN — DORZOLAMIDE HYDROCHLORIDE 1 DROP: 20 SOLUTION/ DROPS OPHTHALMIC at 08:21

## 2025-04-13 ASSESSMENT — PAIN SCALES - GENERAL: PAINLEVEL_OUTOF10: 0

## 2025-04-13 NOTE — CARE COORDINATION
Case Management Assessment  Initial Evaluation    Date/Time of Evaluation: 4/13/2025 11:15 AM  Assessment Completed by: RENO DE LA CRUZ RN    If patient is discharged prior to next notation, then this note serves as note for discharge by case management.    Patient Name: Joel Guzmán                   YOB: 1945  Diagnosis: Hypophosphatemia [E83.39]  Chest pain, unspecified type [R07.9]                   Date / Time: 4/12/2025  2:51 PM    Patient Admission Status: Observation   Readmission Risk (Low < 19, Mod (19-27), High > 27): No data recorded  Current PCP: Clark Azar MD  PCP verified by CM? Yes    Chart Reviewed: Yes      History Provided by: Patient  Patient Orientation: Alert and Oriented    Patient Cognition: Alert    Hospitalization in the last 30 days (Readmission):  No    If yes, Readmission Assessment in  Navigator will be completed.    Advance Directives:      Code Status: Full Code   Patient's Primary Decision Maker is: Legal Next of Kin      Discharge Planning:    Patient lives with: Spouse/Significant Other Type of Home: House  Primary Care Giver: Self  Patient Support Systems include: Children, Family Members, Spouse/Significant Other   Current Financial resources: Medicare  Current community resources: None  Current services prior to admission: None, Other (Comment) (wife has rw, cane, sc wc he has never needed dme)            Current DME:              Type of Home Care services:  None    ADLS  Prior functional level: Independent in ADLs/IADLs  Current functional level: Independent in ADLs/IADLs    PT AM-PAC: 22 /24  OT AM-PAC: /24    Family can provide assistance at DC: Yes  Would you like Case Management to discuss the discharge plan with any other family members/significant others, and if so, who? Yes (if wife present)  Plans to Return to Present Housing: Yes  Other Identified Issues/Barriers to RETURNING to current housing: cardiac work up   Potential Assistance

## 2025-04-14 ENCOUNTER — APPOINTMENT (OUTPATIENT)
Age: 80
End: 2025-04-14
Attending: INTERNAL MEDICINE
Payer: MEDICARE

## 2025-04-14 ENCOUNTER — APPOINTMENT (OUTPATIENT)
Dept: NUCLEAR MEDICINE | Age: 80
End: 2025-04-14
Attending: INTERNAL MEDICINE
Payer: MEDICARE

## 2025-04-14 ENCOUNTER — APPOINTMENT (OUTPATIENT)
Age: 80
End: 2025-04-14
Payer: MEDICARE

## 2025-04-14 VITALS
WEIGHT: 174 LBS | SYSTOLIC BLOOD PRESSURE: 128 MMHG | HEIGHT: 70 IN | HEART RATE: 79 BPM | RESPIRATION RATE: 18 BRPM | DIASTOLIC BLOOD PRESSURE: 99 MMHG | TEMPERATURE: 98.4 F | OXYGEN SATURATION: 92 % | BODY MASS INDEX: 24.91 KG/M2

## 2025-04-14 LAB
ECHO AO ROOT DIAM: 2.6 CM
ECHO AO ROOT INDEX: 1.32 CM/M2
ECHO AV MEAN GRADIENT: 4 MMHG
ECHO AV MEAN VELOCITY: 0.9 M/S
ECHO AV PEAK GRADIENT: 8 MMHG
ECHO AV PEAK VELOCITY: 1.4 M/S
ECHO AV VELOCITY RATIO: 0.71
ECHO AV VTI: 27.8 CM
ECHO BSA: 1.97 M2
ECHO BSA: 1.97 M2
ECHO EST RA PRESSURE: 3 MMHG
ECHO LA AREA 2C: 10.6 CM2
ECHO LA AREA 4C: 15.8 CM2
ECHO LA DIAMETER INDEX: 1.68 CM/M2
ECHO LA DIAMETER: 3.3 CM
ECHO LA MAJOR AXIS: 5.7 CM
ECHO LA MINOR AXIS: 5.2 CM
ECHO LA TO AORTIC ROOT RATIO: 1.27
ECHO LA VOL BP: 26 ML (ref 18–58)
ECHO LA VOL MOD A2C: 18 ML (ref 18–58)
ECHO LA VOL MOD A4C: 35 ML (ref 18–58)
ECHO LA VOL/BSA BIPLANE: 13 ML/M2 (ref 16–34)
ECHO LA VOLUME INDEX MOD A2C: 9 ML/M2 (ref 16–34)
ECHO LA VOLUME INDEX MOD A4C: 18 ML/M2 (ref 16–34)
ECHO LV E' LATERAL VELOCITY: 6.74 CM/S
ECHO LV E' SEPTAL VELOCITY: 7.4 CM/S
ECHO LV EF PHYSICIAN: 55 %
ECHO LV FRACTIONAL SHORTENING: 29 % (ref 28–44)
ECHO LV INTERNAL DIMENSION DIASTOLE INDEX: 2.13 CM/M2
ECHO LV INTERNAL DIMENSION DIASTOLIC: 4.2 CM (ref 4.2–5.9)
ECHO LV INTERNAL DIMENSION SYSTOLIC INDEX: 1.52 CM/M2
ECHO LV INTERNAL DIMENSION SYSTOLIC: 3 CM
ECHO LV IVSD: 1.2 CM (ref 0.6–1)
ECHO LV MASS 2D: 178.2 G (ref 88–224)
ECHO LV MASS INDEX 2D: 90.4 G/M2 (ref 49–115)
ECHO LV POSTERIOR WALL DIASTOLIC: 1.2 CM (ref 0.6–1)
ECHO LV RELATIVE WALL THICKNESS RATIO: 0.57
ECHO LVOT PEAK GRADIENT: 4 MMHG
ECHO LVOT PEAK VELOCITY: 1 M/S
ECHO MV A VELOCITY: 1.05 M/S
ECHO MV E DECELERATION TIME (DT): 246 MS
ECHO MV E VELOCITY: 0.64 M/S
ECHO MV E/A RATIO: 0.61
ECHO MV E/E' LATERAL: 9.5
ECHO MV E/E' RATIO (AVERAGED): 9.07
ECHO MV E/E' SEPTAL: 8.65
ECHO RIGHT VENTRICULAR SYSTOLIC PRESSURE (RVSP): 29 MMHG
ECHO TV REGURGITANT MAX VELOCITY: 2.54 M/S
ECHO TV REGURGITANT PEAK GRADIENT: 26 MMHG
EKG ATRIAL RATE: 91 BPM
EKG P AXIS: 65 DEGREES
EKG P-R INTERVAL: 188 MS
EKG Q-T INTERVAL: 356 MS
EKG QRS DURATION: 76 MS
EKG QTC CALCULATION (BAZETT): 437 MS
EKG R AXIS: 39 DEGREES
EKG T AXIS: 51 DEGREES
EKG VENTRICULAR RATE: 91 BPM
NUC STRESS EJECTION FRACTION: 61 %
STRESS BASELINE DIAS BP: 76 MMHG
STRESS BASELINE HR: 68 BPM
STRESS BASELINE SYS BP: 170 MMHG
STRESS ESTIMATED WORKLOAD: 1 METS
STRESS EXERCISE DUR MIN: 1 MIN
STRESS EXERCISE DUR SEC: 0 SEC
STRESS PEAK DIAS BP: 80 MMHG
STRESS PEAK SYS BP: 171 MMHG
STRESS PERCENT HR ACHIEVED: 63 %
STRESS POST PEAK HR: 90 BPM
STRESS RATE PRESSURE PRODUCT: NORMAL BPM*MMHG
STRESS STAGE 1 DURATION: 1 MIN:SEC
STRESS STAGE 1 HR: 86 BPM
STRESS STAGE RECOVERY 1 DURATION: 1 MIN:SEC
STRESS STAGE RECOVERY 1 HR: 85 BPM
STRESS STAGE RECOVERY 2 BP: NORMAL MMHG
STRESS STAGE RECOVERY 2 DURATION: 1 MIN:SEC
STRESS STAGE RECOVERY 2 HR: 82 BPM
STRESS STAGE RECOVERY 3 BP: NORMAL MMHG
STRESS STAGE RECOVERY 3 DURATION: 1 MIN:SEC
STRESS STAGE RECOVERY 3 HR: 76 BPM
STRESS STAGE RECOVERY 4 DURATION: 1 MIN:SEC
STRESS STAGE RECOVERY 4 HR: 80 BPM
STRESS TARGET HR: 142 BPM
TID: 1.3

## 2025-04-14 PROCEDURE — 2500000003 HC RX 250 WO HCPCS

## 2025-04-14 PROCEDURE — G0378 HOSPITAL OBSERVATION PER HR: HCPCS

## 2025-04-14 PROCEDURE — 93306 TTE W/DOPPLER COMPLETE: CPT | Performed by: INTERNAL MEDICINE

## 2025-04-14 PROCEDURE — 78452 HT MUSCLE IMAGE SPECT MULT: CPT

## 2025-04-14 PROCEDURE — 99232 SBSQ HOSP IP/OBS MODERATE 35: CPT | Performed by: INTERNAL MEDICINE

## 2025-04-14 PROCEDURE — 93018 CV STRESS TEST I&R ONLY: CPT | Performed by: INTERNAL MEDICINE

## 2025-04-14 PROCEDURE — 93017 CV STRESS TEST TRACING ONLY: CPT

## 2025-04-14 PROCEDURE — 94761 N-INVAS EAR/PLS OXIMETRY MLT: CPT

## 2025-04-14 PROCEDURE — 93016 CV STRESS TEST SUPVJ ONLY: CPT | Performed by: INTERNAL MEDICINE

## 2025-04-14 PROCEDURE — 6360000002 HC RX W HCPCS: Performed by: INTERNAL MEDICINE

## 2025-04-14 PROCEDURE — 93010 ELECTROCARDIOGRAM REPORT: CPT | Performed by: INTERNAL MEDICINE

## 2025-04-14 PROCEDURE — 2500000003 HC RX 250 WO HCPCS: Performed by: INTERNAL MEDICINE

## 2025-04-14 PROCEDURE — 6370000000 HC RX 637 (ALT 250 FOR IP)

## 2025-04-14 PROCEDURE — 6370000000 HC RX 637 (ALT 250 FOR IP): Performed by: INTERNAL MEDICINE

## 2025-04-14 PROCEDURE — 93306 TTE W/DOPPLER COMPLETE: CPT

## 2025-04-14 PROCEDURE — 99238 HOSP IP/OBS DSCHRG MGMT 30/<: CPT | Performed by: HOSPITALIST

## 2025-04-14 PROCEDURE — 3430000000 HC RX DIAGNOSTIC RADIOPHARMACEUTICAL: Performed by: INTERNAL MEDICINE

## 2025-04-14 PROCEDURE — A9500 TC99M SESTAMIBI: HCPCS | Performed by: INTERNAL MEDICINE

## 2025-04-14 RX ORDER — AMINOPHYLLINE 25 MG/ML
50 INJECTION, SOLUTION INTRAVENOUS PRN
Status: ACTIVE | OUTPATIENT
Start: 2025-04-14 | End: 2025-04-14

## 2025-04-14 RX ORDER — ATROPINE SULFATE 0.1 MG/ML
0.5 INJECTION INTRAVENOUS EVERY 5 MIN PRN
Status: ACTIVE | OUTPATIENT
Start: 2025-04-14 | End: 2025-04-14

## 2025-04-14 RX ORDER — REGADENOSON 0.08 MG/ML
0.4 INJECTION, SOLUTION INTRAVENOUS
Status: COMPLETED | OUTPATIENT
Start: 2025-04-14 | End: 2025-04-14

## 2025-04-14 RX ORDER — CARVEDILOL 12.5 MG/1
12.5 TABLET ORAL 2 TIMES DAILY WITH MEALS
Qty: 60 TABLET | Refills: 3 | Status: SHIPPED | OUTPATIENT
Start: 2025-04-14

## 2025-04-14 RX ORDER — METOPROLOL TARTRATE 1 MG/ML
5 INJECTION, SOLUTION INTRAVENOUS EVERY 5 MIN PRN
Status: ACTIVE | OUTPATIENT
Start: 2025-04-14 | End: 2025-04-14

## 2025-04-14 RX ORDER — TETRAKIS(2-METHOXYISOBUTYLISOCYANIDE)COPPER(I) TETRAFLUOROBORATE 1 MG/ML
15 INJECTION, POWDER, LYOPHILIZED, FOR SOLUTION INTRAVENOUS
Status: COMPLETED | OUTPATIENT
Start: 2025-04-14 | End: 2025-04-14

## 2025-04-14 RX ORDER — NITROGLYCERIN 0.4 MG/1
0.4 TABLET SUBLINGUAL EVERY 5 MIN PRN
Status: ACTIVE | OUTPATIENT
Start: 2025-04-14 | End: 2025-04-14

## 2025-04-14 RX ORDER — TETRAKIS(2-METHOXYISOBUTYLISOCYANIDE)COPPER(I) TETRAFLUOROBORATE 1 MG/ML
44.7 INJECTION, POWDER, LYOPHILIZED, FOR SOLUTION INTRAVENOUS
Status: COMPLETED | OUTPATIENT
Start: 2025-04-14 | End: 2025-04-14

## 2025-04-14 RX ORDER — SODIUM CHLORIDE 0.9 % (FLUSH) 0.9 %
5-40 SYRINGE (ML) INJECTION PRN
Status: ACTIVE | OUTPATIENT
Start: 2025-04-14 | End: 2025-04-14

## 2025-04-14 RX ORDER — SODIUM CHLORIDE 9 MG/ML
500 INJECTION, SOLUTION INTRAVENOUS CONTINUOUS PRN
Status: ACTIVE | OUTPATIENT
Start: 2025-04-14 | End: 2025-04-14

## 2025-04-14 RX ORDER — ALBUTEROL SULFATE 90 UG/1
2 INHALANT RESPIRATORY (INHALATION) PRN
Status: ACTIVE | OUTPATIENT
Start: 2025-04-14 | End: 2025-04-14

## 2025-04-14 RX ADMIN — CARVEDILOL 12.5 MG: 12.5 TABLET, FILM COATED ORAL at 09:50

## 2025-04-14 RX ADMIN — SODIUM CHLORIDE, PRESERVATIVE FREE 10 ML: 5 INJECTION INTRAVENOUS at 08:28

## 2025-04-14 RX ADMIN — DORZOLAMIDE HYDROCHLORIDE 1 DROP: 20 SOLUTION/ DROPS OPHTHALMIC at 09:51

## 2025-04-14 RX ADMIN — ASPIRIN 81 MG: 81 TABLET, COATED ORAL at 09:50

## 2025-04-14 RX ADMIN — REGADENOSON 0.4 MG: 0.08 INJECTION, SOLUTION INTRAVENOUS at 08:28

## 2025-04-14 RX ADMIN — SODIUM CHLORIDE, PRESERVATIVE FREE 10 ML: 5 INJECTION INTRAVENOUS at 09:53

## 2025-04-14 RX ADMIN — Medication 44.7 MILLICURIE: at 08:28

## 2025-04-14 RX ADMIN — Medication 15 MILLICURIE: at 07:57

## 2025-04-14 NOTE — DISCHARGE SUMMARY
[facility]   IN-PATIENT SERVICE  [facility]  Discharge Summary     Patient ID: Joel Guzmán  :  1945   MRN: [id number]     ACCOUNT:  [id number]   Patient's PCP: Clark Azar MD  Admit Date: 2025   Discharge Date: 2025     Length of Stay: 0  Code Status:  Full Code  Admitting Physician: Wesley Wang MD  Discharge Physician: Wesley Wang MD     Active Discharge Diagnoses:     Principal diagnosis:   Hospital Problem Lists:  Principal Problem:    Hypophosphatemia  Active Problems:    Essential hypertension    Coronary artery disease    Chest pain  Resolved Problems:    * No resolved hospital problems. *      Admission Condition:  fair     Discharged Condition: stable    Hospital Stay:     Hospital Course:  Joel Guzmán is a 80 y.o. male who was admitted for the management of  Hypophosphatemia , presented to ER with Chest Pain (Mid-sternal into left arm )      Per H&P:   This 80-year-old male is admitted for management of hypophosphatemia. He initially presented to this facility's emergency department complaints of midsternal chest pressure that radiated down his left arm. He reports associated shortness of breath, dizziness, lightheadedness. He states he recently recovered from an upper respiratory infection, has had a residual nonproductive cough. He did previously see a cardiologist greater than 10 years ago, sounds as though he had previous PCI, although patient could not recall the name of his cardiologist or any interventions he has previously had. Denies any associated fevers, chills, nausea, vomiting, change in bowel or bladder habits. No other reported concerns at this time.       Summary:  Patient was admitted and his troponin remained negative.  He had stress test and echo which were unremarkable for any significant abnormality.  Seen by cardiology and remained chest pain-free.  His phosphorus was replaced and normalized.  He is eating and

## 2025-04-14 NOTE — PROGRESS NOTES
Cardiology progress note        Date:  4/13/2025  Patient: Joel Guzmán  Admission:  4/12/2025  2:51 PM  Admit DX: Hypophosphatemia [E83.39]  Chest pain, unspecified type [R07.9]  Age:  80 y.o., 1945     LOS: 0 days     Reason for evaluation:   Chest pain       SUBJECTIVE:     The patient was seen and examined. Notes and labs reviewed.    Patient denies any chest pain however still complains of cough    OBJECTIVE:      EXAM:   Vitals:    VITALS:  /86   Pulse 70   Temp 97.5 °F (36.4 °C) (Temporal)   Resp 18   Ht 1.778 m (5' 10\")   Wt 78.1 kg (172 lb 2.9 oz)   SpO2 96%   BMI 24.71 kg/m²    24HR INTAKE/OUTPUT:  No intake or output data in the 24 hours ending 04/13/25 1144    General appearance: awake, alert, no apparent discomfort  HEENT: atraumatic, normocephalic, no JVD, no significant carotid bruit  Lungs: clear to auscultation bilaterally  Heart: regular rate and rhythm, S1, S2, 2/6 systolic murmur  Abdomen: soft, non-tender; bowel sounds active  Extremities: no significant lower extremity edema  Neurologic: no obvious neurologic deficit    Current Inpatient Medications:   aspirin  81 mg Oral Daily    latanoprost  1 drop Both Eyes Nightly    sodium chloride flush  5-40 mL IntraVENous 2 times per day    enoxaparin  40 mg SubCUTAneous Daily    dorzolamide  1 drop Both Eyes BID    And    timolol  1 drop Both Eyes BID    carvedilol  12.5 mg Oral BID WC    atorvastatin  10 mg Oral Nightly       IV Infusions (if any):   sodium chloride         Diagnostics:   Telemetry: Sinus rhythm    EKG  Normal sinus rhythm     Echocardiogram 12/13/2016  · Normal left ventricular systolic function with EF 60%.   · Normal size right ventricle. Normal right ventricular systolic function.   · Mild tricuspid valve regurgitation. The RVSP is normal.   · Small pericardial effusion. No evidence of hemodynamic compromise      Exercise stress test 12/13/2016  Baseline ECG indicates sinus rhythm.  There was no 
              Cardiology progress note        Date:  4/14/2025  Patient: Joel Guzmán  Admission:  4/12/2025  2:51 PM  Admit DX: Hypophosphatemia [E83.39]  Chest pain, unspecified type [R07.9]  Age:  80 y.o., 1945     LOS: 0 days     Reason for evaluation:   Chest pain       SUBJECTIVE:     The patient was seen and examined. Notes and labs reviewed.    Patient is feeling fine and denies any chest pain or shortness of breath    Echo and stress tests are unremarkable    OBJECTIVE:      EXAM:   Vitals:    VITALS:  /86   Pulse 78   Temp 97.5 °F (36.4 °C) (Temporal)   Resp 16   Ht 1.778 m (5' 10\")   Wt 78.9 kg (174 lb)   SpO2 95%   BMI 24.97 kg/m²    24HR INTAKE/OUTPUT:  No intake or output data in the 24 hours ending 04/14/25 0935    General appearance: awake, alert, no apparent discomfort  HEENT: atraumatic, normocephalic, no JVD, no significant carotid bruit  Lungs: clear to auscultation bilaterally  Heart: regular rate and rhythm, S1, S2, 2/6 systolic murmur  Abdomen: soft, non-tender; bowel sounds active  Extremities: no significant lower extremity edema  Neurologic: no obvious neurologic deficit    Current Inpatient Medications:   aspirin  81 mg Oral Daily    latanoprost  1 drop Both Eyes Nightly    sodium chloride flush  5-40 mL IntraVENous 2 times per day    enoxaparin  40 mg SubCUTAneous Daily    dorzolamide  1 drop Both Eyes BID    And    timolol  1 drop Both Eyes BID    carvedilol  12.5 mg Oral BID WC    atorvastatin  10 mg Oral Nightly       IV Infusions (if any):   sodium chloride      sodium chloride         Diagnostics:   Telemetry: Sinus rhythm     EKG  Normal sinus rhythm    Echocardiogram 4/14/2025    Normal left ventricular systolic function with EF 55 - 60%. Mildly increased wall thickness. Normal wall motion.    Right ventricle size is normal. Normal systolic function.     Aortic Valve: Mild aortic regurgitation.    Mild tricuspid regurgitation. Normal RVSP 29 mmHg.    Lexiscan 
4 Eyes Skin Assessment     NAME:  Joel Guzmán  YOB: 1945  MEDICAL RECORD NUMBER:  [id number]    The patient is being assessed for  Admission    I agree that at least one RN has performed a thorough Head to Toe Skin Assessment on the patient. ALL assessment sites listed below have been assessed.      Areas assessed by both nurses:    Head, Face, Ears, Shoulders, Back, Chest, Arms, Elbows, Hands, Sacrum. Buttock, Coccyx, Ischium, and Legs. Feet and Heels        Does the Patient have a Wound? No noted wound(s)       Sher Prevention initiated by RN: No  Wound Care Orders initiated by RN: No    Pressure Injury (Stage 3,4, Unstageable, DTI, NWPT, and Complex wounds) if present, place Wound referral order by RN under : No    New Ostomies, if present place, Ostomy referral order under : No     Nurse 1 eSignature: Electronically signed by Sonia De Souza RN on 4/12/25 at 7:05 PM EDT    **SHARE this note so that the co-signing nurse can place an eSignature**    Nurse 2 eSignature: Electronically signed by Alejandra Russ RN on 4/12/25 at 7:12 PM EDT   
ADMISSION NOTE         Patient admitted to room: 2036     Time of admit: 1832     Admit from: ER      Reason for admission: Hypophosphatemia         Family at bedside: Yes     Patient is currently: resting in bed comfortably, vitals obtained, telemetry placed on pt. No distress noted. Patient has been oriented to room, educated on how to use call light, and to call for assistance prior to getting up. Bed in lowest and locked position. 2 siderails up for safety. Call light within reach.        
Discharge Note:      All discharge instructions given at this time as well as all patient belongings returned to patient. Pt denies any further questions regarding discharge at this time. Pt given discharge packet with unit letter, discharge instructions/restrictions and medication handouts regarding all discharge medications and side effects. Pt denies any further issues at this time.    Pt wheeled out to front discharge doors at this time.     Pt left premises with his wife without any issues in private vehicle at this time.    
End Of Shift Note  [facility]  Summary of shift: Pt had an uneventful night. Pressures soft but stable. No needs expressed at time of writing.    Vitals:    Vitals:    04/14/25 0000 04/14/25 0300 04/14/25 0321 04/14/25 0400   BP: 138/76 127/71  126/70   Pulse: 80 92 80 86   Resp:    18   Temp: 98.2 °F (36.8 °C)   97.1 °F (36.2 °C)   TempSrc: Temporal   Temporal   SpO2: 92% 92% 94% 95%   Weight:    78.9 kg (174 lb)   Height:            I&O: No intake or output data in the 24 hours ending 04/14/25 0604    Resp Status: RA    Ventilator Settings:     / / /     Critical Care IV infusions:   sodium chloride          LDA:   Peripheral IV 04/12/25 Distal;Right;Anterior Cephalic (Active)   Number of days: 1       Incision 12/03/20 Abdomen Lower;Medial (Active)   Number of days: 1592          
Occupational Therapy  Facility/Department: [facility]  Occupational Therapy Initial Assessment    Name: Joel Guzmán  : 1945  MRN: [id number]  Date of Service: 2025    Discharge Recommendations:  Patient would benefit from continued therapy after discharge  OT Equipment Recommendations  Equipment Needed: Yes  Mobility Devices: ADL Assistive Devices  ADL Assistive Devices: Reacher;Long-handled Shoe Horn;Long-handled Sponge;Emergency Alert System       Patient Diagnosis(es): The primary encounter diagnosis was Chest pain, unspecified type. Diagnoses of Hypophosphatemia and Angina pectoris were also pertinent to this visit.  Past Medical History:  has a past medical history of Allergic rhinitis, Coronary artery disease, GERD without esophagitis, Glaucoma, and Hypertension.  Past Surgical History:  has a past surgical history that includes Cardiac catheterization (); Vasectomy; Colonoscopy; Tonsillectomy; Cholecystectomy, laparoscopic (N/A, 12/3/2020); and Eye surgery (2022).      Per H&P:    Joel Guzmán is a 80 y.o. Non- / non  male who presents with Chest Pain (Mid-sternal into left arm )   and is admitted to the hospital for the management of Hypophosphatemia.     This 80-year-old male is admitted for management of hypophosphatemia.  He initially presented to this facility's emergency department complaints of midsternal chest pressure that radiated down his left arm.  He reports associated shortness of breath, dizziness, lightheadedness.  He states he recently recovered from an upper respiratory infection, has had a residual nonproductive cough.  He did previously see a cardiologist greater than 10 years ago, sounds as though he had previous PCI, although patient could not recall the name of his cardiologist or any interventions he has previously had.  Denies any associated fevers, chills, nausea, vomiting, change in bowel or bladder habits.  No other reported concerns at this 
Physical Therapy  Facility/Department: [facility]   Physical Therapy Initial Evaluation    Patient Name: Joel Guzmán        MRN: [id number]    : 1945    Date of Service: 2025    Chief Complaint   Patient presents with    Chest Pain     Mid-sternal into left arm      Past Medical History:  has a past medical history of Allergic rhinitis, Coronary artery disease, GERD without esophagitis, Glaucoma, and Hypertension.  Past Surgical History:  has a past surgical history that includes Cardiac catheterization (); Vasectomy; Colonoscopy; Tonsillectomy; Cholecystectomy, laparoscopic (N/A, 12/3/2020); and Eye surgery (2022).    Discharge Recommendations          Assessment  Body Structures, Functions, Activity Limitations Requiring Skilled Therapeutic Intervention: Decreased functional mobility , Decreased balance, Decreased endurance    Assessment: Skilled therapy needed to maximize independence with functional mobility as well as improve endurance, balance and overall safety. Patient independent with transfers, ambulated without device SBA, will progress as able.    Therapy Prognosis: Good  Decision Making: Low Complexity  Requires PT Follow-Up: Yes  Activity Tolerance  Activity Tolerance: Patient tolerated treatment well  Safety Devices  Type of Devices: Nurse notified, Left in chair, Call light within reach, Gait belt    AM-PAC  AM-PAC Basic Mobility - Inpatient   How much help is needed turning from your back to your side while in a flat bed without using bedrails?: None  How much help is needed moving from lying on your back to sitting on the side of a flat bed without using bedrails?: None  How much help is needed moving to and from a bed to a chair?: None  How much help is needed standing up from a chair using your arms?: None  How much help is needed walking in hospital room?: A Little  How much help is needed climbing 3-5 steps with a railing?: A Little  AM-PAC Inpatient Mobility Raw Score : 
edema, redness, tenderness in the calves  Skin:  no gross lesions or rashes    Assessment:        Hospital Problems           Last Modified POA    * (Principal) Hypophosphatemia 4/12/2025 Yes    Essential hypertension 4/12/2025 Yes    Coronary artery disease 4/12/2025 Yes    Chest pain 4/12/2025 Yes       Plan:        Chest pain: Acute coronary syndrome has been ruled out.  Plan for echocardiogram and stress test.  Appreciate cardiology input  Hypophosphatemia most likely related to poor oral intake due to recent cold.  This was replaced and resolved.    Wesley Wang MD  4/13/2025  9:54 AM

## 2025-04-14 NOTE — NURSING NOTE
Patient tolerated testing well, to nuclear med for further imaging in NAD. EKG medicine information sheet to patient for education.

## 2025-04-14 NOTE — PLAN OF CARE
Problem: Discharge Planning  Goal: Discharge to home or other facility with appropriate resources  Outcome: Progressing  Flowsheets (Taken 4/14/2025 0800)  Discharge to home or other facility with appropriate resources:   Identify barriers to discharge with patient and caregiver   Arrange for needed discharge resources and transportation as appropriate   Identify discharge learning needs (meds, wound care, etc)   Refer to discharge planning if patient needs post-hospital services based on physician order or complex needs related to functional status, cognitive ability or social support system     Problem: Pain  Goal: Verbalizes/displays adequate comfort level or baseline comfort level  Outcome: Progressing     Problem: Safety - Adult  Goal: Free from fall injury  Outcome: Progressing     Problem: Cardiovascular - Adult  Goal: Maintains optimal cardiac output and hemodynamic stability  Outcome: Progressing  Goal: Absence of cardiac dysrhythmias or at baseline  Outcome: Progressing

## 2025-04-15 ENCOUNTER — CARE COORDINATION (OUTPATIENT)
Dept: CARE COORDINATION | Age: 80
End: 2025-04-15

## 2025-04-15 DIAGNOSIS — R07.9 CHEST PAIN, UNSPECIFIED TYPE: Primary | ICD-10-CM

## 2025-04-15 PROCEDURE — 1111F DSCHRG MED/CURRENT MED MERGE: CPT | Performed by: FAMILY MEDICINE

## 2025-04-15 NOTE — CARE COORDINATION
Care Transitions Note    Initial Call - Call within 2 business days of discharge: Yes    Attempted to reach patient for transitions of care follow up. Unable to reach patient.    Outreach Attempts:   HIPAA compliant voicemail left for patient.     Patient: Joel Guzmán    Patient : 1945   MRN: [id number]    Reason for Admission: Chest pain/hypophosphatemia  Discharge Date: 25  RURS: No data recorded  Last Discharge Facility       Date Complaint Diagnosis Description Type Department Provider    25 Chest Pain Chest pain, unspecified type ... ED to Hosp-Admission (Discharged) (ADMITTED) Wesley Hamlin MD; Adriana,...            Was this an external facility discharge? No    Follow Up Appointment:   Patient does not have a follow up appointment scheduled at time of call.  Will route message to PCP office for HFU appointment      Plan for follow-up on next business day.      KEVIN BUNN RN    Care Transitions Note    Initial Call - Call within 2 business days of discharge: Yes    Patient Current Location:  Home: [address] [place] [address]    Care Transition Nurse contacted the patient by telephone to perform post hospital discharge assessment, verified name and  as identifiers.  Provided introduction to self, and explanation of the Care Transition Nurse role.    Patient: Joel Guzmán    Patient : 1945   MRN: [id number]    Reason for Admission: Chest pain/hypophosphatemia  Discharge Date: 25  RURS: No data recorded    Last Discharge Facility       Date Complaint Diagnosis Description Type Department Provider    25 Chest Pain Chest pain, unspecified type ... ED to Hosp-Admission (Discharged) (ADMITTED) Wesley Hamlin MD; Adriana,...            Was this an external facility discharge? No    Additional needs identified to be addressed with provider   Message already routed to PCP office for HFU appointment             Method of

## 2025-04-19 ENCOUNTER — HOSPITAL ENCOUNTER (EMERGENCY)
Age: 80
Discharge: HOME | End: 2025-04-20
Attending: EMERGENCY MEDICINE
Payer: MEDICARE

## 2025-04-19 VITALS
DIASTOLIC BLOOD PRESSURE: 89 MMHG | OXYGEN SATURATION: 97 % | HEART RATE: 90 BPM | SYSTOLIC BLOOD PRESSURE: 175 MMHG | RESPIRATION RATE: 17 BRPM

## 2025-04-19 DIAGNOSIS — R07.89 ATYPICAL CHEST PAIN: Primary | ICD-10-CM

## 2025-04-19 DIAGNOSIS — M54.13 RADICULOPATHY OF CERVICOTHORACIC REGION: ICD-10-CM

## 2025-04-19 DIAGNOSIS — M79.602 PAIN OF LEFT UPPER EXTREMITY: ICD-10-CM

## 2025-04-19 LAB
ANION GAP SERPL CALCULATED.3IONS-SCNC: 11 MMOL/L (ref 9–16)
BASOPHILS # BLD: <0.03 K/UL (ref 0–0.2)
BASOPHILS NFR BLD: 0 % (ref 0–2)
BUN SERPL-MCNC: 13 MG/DL (ref 8–23)
CALCIUM SERPL-MCNC: 9.3 MG/DL (ref 8.8–10.2)
CHLORIDE SERPL-SCNC: 106 MMOL/L (ref 98–107)
CO2 SERPL-SCNC: 22 MMOL/L (ref 20–31)
CREAT SERPL-MCNC: 0.9 MG/DL (ref 0.7–1.2)
D DIMER PPP FEU-MCNC: 0.32 UG/ML FEU (ref 0–0.59)
EOSINOPHIL # BLD: 0.11 K/UL (ref 0–0.44)
EOSINOPHILS RELATIVE PERCENT: 1 % (ref 1–4)
ERYTHROCYTE [DISTWIDTH] IN BLOOD BY AUTOMATED COUNT: 13.2 % (ref 11.8–14.4)
GFR, ESTIMATED: 86 ML/MIN/1.73M2
GLUCOSE SERPL-MCNC: 125 MG/DL (ref 82–115)
HCT VFR BLD AUTO: 42.5 % (ref 40.7–50.3)
HGB BLD-MCNC: 15.6 G/DL (ref 13–17)
IMM GRANULOCYTES # BLD AUTO: 0.04 K/UL (ref 0–0.3)
IMM GRANULOCYTES NFR BLD: 1 %
LYMPHOCYTES NFR BLD: 1.08 K/UL (ref 1.1–3.7)
LYMPHOCYTES RELATIVE PERCENT: 13 % (ref 24–43)
MCH RBC QN AUTO: 31 PG (ref 25.2–33.5)
MCHC RBC AUTO-ENTMCNC: 36.7 G/DL (ref 28.4–34.8)
MCV RBC AUTO: 84.5 FL (ref 82.6–102.9)
MONOCYTES NFR BLD: 0.65 K/UL (ref 0.1–1.2)
MONOCYTES NFR BLD: 8 % (ref 3–12)
NEUTROPHILS NFR BLD: 77 % (ref 36–65)
NEUTS SEG NFR BLD: 6.54 K/UL (ref 1.5–8.1)
NRBC BLD-RTO: 0 PER 100 WBC
PLATELET # BLD AUTO: 202 K/UL (ref 138–453)
PMV BLD AUTO: 10 FL (ref 8.1–13.5)
POTASSIUM SERPL-SCNC: 3.8 MMOL/L (ref 3.7–5.3)
RBC # BLD AUTO: 5.03 M/UL (ref 4.21–5.77)
SODIUM SERPL-SCNC: 139 MMOL/L (ref 136–145)
TROPONIN I SERPL HS-MCNC: 19 NG/L (ref 0–22)
WBC OTHER # BLD: 8.4 K/UL (ref 3.5–11.3)

## 2025-04-19 PROCEDURE — 96374 THER/PROPH/DIAG INJ IV PUSH: CPT

## 2025-04-19 PROCEDURE — 84484 ASSAY OF TROPONIN QUANT: CPT

## 2025-04-19 PROCEDURE — 99284 EMERGENCY DEPT VISIT MOD MDM: CPT

## 2025-04-19 PROCEDURE — 85025 COMPLETE CBC W/AUTO DIFF WBC: CPT

## 2025-04-19 PROCEDURE — 6360000002 HC RX W HCPCS: Performed by: EMERGENCY MEDICINE

## 2025-04-19 PROCEDURE — 96375 TX/PRO/DX INJ NEW DRUG ADDON: CPT

## 2025-04-19 PROCEDURE — 80048 BASIC METABOLIC PNL TOTAL CA: CPT

## 2025-04-19 PROCEDURE — 93005 ELECTROCARDIOGRAM TRACING: CPT | Performed by: EMERGENCY MEDICINE

## 2025-04-19 PROCEDURE — 85379 FIBRIN DEGRADATION QUANT: CPT

## 2025-04-19 RX ORDER — IBUPROFEN 600 MG/1
600 TABLET, FILM COATED ORAL EVERY 6 HOURS PRN
Qty: 120 TABLET | Refills: 0 | Status: SHIPPED | OUTPATIENT
Start: 2025-04-19

## 2025-04-19 RX ORDER — MORPHINE SULFATE 4 MG/ML
4 INJECTION, SOLUTION INTRAMUSCULAR; INTRAVENOUS ONCE
Status: COMPLETED | OUTPATIENT
Start: 2025-04-19 | End: 2025-04-19

## 2025-04-19 RX ORDER — PREDNISONE 20 MG/1
50 TABLET ORAL DAILY
Qty: 13 TABLET | Refills: 0 | Status: SHIPPED | OUTPATIENT
Start: 2025-04-19 | End: 2025-04-24

## 2025-04-19 RX ORDER — LABETALOL HYDROCHLORIDE 5 MG/ML
20 INJECTION, SOLUTION INTRAVENOUS ONCE
Status: COMPLETED | OUTPATIENT
Start: 2025-04-19 | End: 2025-04-19

## 2025-04-19 RX ORDER — KETOROLAC TROMETHAMINE 15 MG/ML
15 INJECTION, SOLUTION INTRAMUSCULAR; INTRAVENOUS ONCE
Status: COMPLETED | OUTPATIENT
Start: 2025-04-19 | End: 2025-04-19

## 2025-04-19 RX ORDER — OXYCODONE AND ACETAMINOPHEN 5; 325 MG/1; MG/1
1 TABLET ORAL EVERY 6 HOURS PRN
Qty: 12 TABLET | Refills: 0 | Status: SHIPPED | OUTPATIENT
Start: 2025-04-19 | End: 2025-04-22

## 2025-04-19 RX ADMIN — KETOROLAC TROMETHAMINE 15 MG: 15 INJECTION, SOLUTION INTRAMUSCULAR; INTRAVENOUS at 21:42

## 2025-04-19 RX ADMIN — MORPHINE SULFATE 4 MG: 4 INJECTION INTRAVENOUS at 21:43

## 2025-04-19 RX ADMIN — LABETALOL HYDROCHLORIDE 20 MG: 5 INJECTION, SOLUTION INTRAVENOUS at 21:43

## 2025-04-19 ASSESSMENT — PAIN SCALES - GENERAL: PAINLEVEL_OUTOF10: 10

## 2025-04-19 ASSESSMENT — PAIN DESCRIPTION - LOCATION
LOCATION: CHEST
LOCATION: HEAD;CHEST;ARM

## 2025-04-19 ASSESSMENT — PAIN - FUNCTIONAL ASSESSMENT: PAIN_FUNCTIONAL_ASSESSMENT: 0-10

## 2025-04-20 PROCEDURE — 6370000000 HC RX 637 (ALT 250 FOR IP): Performed by: EMERGENCY MEDICINE

## 2025-04-20 RX ORDER — OXYCODONE AND ACETAMINOPHEN 5; 325 MG/1; MG/1
1 TABLET ORAL ONCE
Refills: 0 | Status: COMPLETED | OUTPATIENT
Start: 2025-04-20 | End: 2025-04-20

## 2025-04-20 RX ADMIN — OXYCODONE AND ACETAMINOPHEN 1 TABLET: 325; 5 TABLET ORAL at 01:04

## 2025-04-20 ASSESSMENT — PAIN SCALES - GENERAL: PAINLEVEL_OUTOF10: 7

## 2025-04-20 NOTE — DISCHARGE INSTRUCTIONS
You may take Percocet as needed for more severe episodes of pain.  I do recommend use of steroids at home to see if this does help with the nerve inflammation.  Over-the-counter medications such as Tylenol and or Motrin can also be used for less severe episodes of pain or if you are about to drive.

## 2025-04-20 NOTE — ED PROVIDER NOTES
EMERGENCY DEPARTMENT ENCOUNTER    Pt Name: Joel Guzmán  MRN: [id number]  Birthdate 1945  Date of evaluation: 4/19/25  CHIEF COMPLAINT       Chief Complaint   Patient presents with    Chest Pain     Seen here Saturday and discharge Monday for same issues, c/o chest pain that radiates down his arm     HISTORY OF PRESENT ILLNESS   80-year-old male presents emergency room for pain shooting down his arms and into his left chest.  Pain started bothering him an hour or so ago.  He had similar pain last weekend and was admitted into the hospital for cardiac workup.  Patient had unremarkable stress test and echo and was discharged home.  He has not had any issues with pain since he was in the emergency room last week.  Pain started again tonight out of nowhere.  Patient has increased pain with movement of his left arm.  Pain seems to start in the shoulder radiating into the chest and down the arm.  He does describe it as a shooting type of pain.             REVIEW OF SYSTEMS     Review of Systems   Cardiovascular:  Positive for chest pain.   Musculoskeletal:  Positive for arthralgias.     PASTMEDICAL HISTORY     Past Medical History:   Diagnosis Date    Allergic rhinitis     Coronary artery disease     GERD without esophagitis     Glaucoma     Hypertension      Past Problem List  Patient Active Problem List   Diagnosis Code    Essential hypertension I10    Vitamin D deficiency E55.9    SOB (shortness of breath) R06.02    Acute cholecystitis K81.0    Hypertension I10    GERD without esophagitis K21.9    Elevated blood pressure reading R03.0    Coronary artery disease I25.10    S/P laparoscopic cholecystectomy Z90.49    Mixed hyperlipidemia E78.2    Hypophosphatemia E83.39    Chest pain R07.9     SURGICAL HISTORY       Past Surgical History:   Procedure Laterality Date    CARDIAC CATHETERIZATION  2006    [facility]    CHOLECYSTECTOMY, LAPAROSCOPIC N/A 12/3/2020    CHOLECYSTECTOMY LAPAROSCOPIC ROBOTIC XI performed by Aj

## 2025-04-20 NOTE — ED NOTES
Pt reports recent admit for similar symptoms pain in chest neck head ache with tingles that go down arms. Pt reports recent echo and stress test with adjustments to blood pressure medication. Out on Monday. Symptoms started to return today. Pt does state he had a pinched nerve hx and is unsure if it is due to this or heart issues.

## 2025-04-21 ENCOUNTER — CARE COORDINATION (OUTPATIENT)
Dept: CARE COORDINATION | Age: 80
End: 2025-04-21

## 2025-04-21 NOTE — CARE COORDINATION
Care Transitions Note    Follow Up Call     Attempted to reach patient for transitions of care follow up.  Unable to reach patient.      Outreach Attempts:   HIPAA compliant voicemail left for patient.     Care Summary Note: First attempt follow up/ ED follow up. Left VM    Follow Up Appointment:       Plan for follow-up on next business day.  based on severity of symptoms and risk factors. Plan for next call: ymptom management-follow up on chest pain and s/s of low phosphorus blood levels  follow-up appointment-did PCP office schedule HFU and did he call cardio and schedule appointment?  ED follow up    Leanna Perez RN

## 2025-04-22 ENCOUNTER — CARE COORDINATION (OUTPATIENT)
Dept: CARE COORDINATION | Age: 80
End: 2025-04-22

## 2025-04-22 LAB
EKG ATRIAL RATE: 80 BPM
EKG P AXIS: 79 DEGREES
EKG P-R INTERVAL: 192 MS
EKG Q-T INTERVAL: 378 MS
EKG QRS DURATION: 88 MS
EKG QTC CALCULATION (BAZETT): 435 MS
EKG R AXIS: 69 DEGREES
EKG T AXIS: 61 DEGREES
EKG VENTRICULAR RATE: 80 BPM

## 2025-04-22 PROCEDURE — 93010 ELECTROCARDIOGRAM REPORT: CPT | Performed by: INTERNAL MEDICINE

## 2025-04-22 NOTE — CARE COORDINATION
Care Transitions Note    Follow Up Call     Attempted to reach patient for transitions of care follow up.  Unable to reach patient.      Outreach Attempts:   HIPAA compliant voicemail left for patient.     Care Summary Note: Second attempt follow up/ ED follow up. Left VM. Closing for LOPEZ    Follow Up Appointment:   Future Appointments         Provider Specialty Dept Phone    7/17/2025 12:50 PM Ramiro Sethi MD Cardiology [phone]            No further follow-up call indicated based on severity of symptoms and risk factors. Plan for next call:  AL Perez RN

## 2025-05-22 ENCOUNTER — HOSPITAL ENCOUNTER (INPATIENT)
Age: 80
LOS: 1 days | Discharge: ANOTHER HOSPITAL | DRG: 321 | End: 2025-05-23
Attending: EMERGENCY MEDICINE | Admitting: INTERNAL MEDICINE
Payer: MEDICARE

## 2025-05-22 DIAGNOSIS — I21.19 ACUTE ST ELEVATION MYOCARDIAL INFARCTION (STEMI) OF INFEROLATERAL WALL (HCC): Primary | ICD-10-CM

## 2025-05-22 DIAGNOSIS — I21.3 STEMI (ST ELEVATION MYOCARDIAL INFARCTION) (HCC): ICD-10-CM

## 2025-05-22 DIAGNOSIS — I21.3 ST ELEVATION MYOCARDIAL INFARCTION (STEMI), UNSPECIFIED ARTERY (HCC): ICD-10-CM

## 2025-05-22 PROCEDURE — 85520 HEPARIN ASSAY: CPT

## 2025-05-22 PROCEDURE — 84484 ASSAY OF TROPONIN QUANT: CPT

## 2025-05-22 PROCEDURE — 80048 BASIC METABOLIC PNL TOTAL CA: CPT

## 2025-05-22 PROCEDURE — 85025 COMPLETE CBC W/AUTO DIFF WBC: CPT

## 2025-05-22 PROCEDURE — 6360000002 HC RX W HCPCS: Performed by: EMERGENCY MEDICINE

## 2025-05-22 PROCEDURE — 93005 ELECTROCARDIOGRAM TRACING: CPT | Performed by: INTERNAL MEDICINE

## 2025-05-22 PROCEDURE — 85610 PROTHROMBIN TIME: CPT

## 2025-05-22 PROCEDURE — 96375 TX/PRO/DX INJ NEW DRUG ADDON: CPT

## 2025-05-22 PROCEDURE — 85730 THROMBOPLASTIN TIME PARTIAL: CPT

## 2025-05-22 PROCEDURE — 99285 EMERGENCY DEPT VISIT HI MDM: CPT

## 2025-05-22 RX ORDER — MORPHINE SULFATE 4 MG/ML
4 INJECTION, SOLUTION INTRAMUSCULAR; INTRAVENOUS ONCE
Refills: 0 | Status: COMPLETED | OUTPATIENT
Start: 2025-05-22 | End: 2025-05-22

## 2025-05-22 RX ORDER — KETOROLAC TROMETHAMINE 15 MG/ML
15 INJECTION, SOLUTION INTRAMUSCULAR; INTRAVENOUS ONCE
Status: COMPLETED | OUTPATIENT
Start: 2025-05-22 | End: 2025-05-22

## 2025-05-22 RX ORDER — DEXAMETHASONE SODIUM PHOSPHATE 4 MG/ML
4 INJECTION, SOLUTION INTRA-ARTICULAR; INTRALESIONAL; INTRAMUSCULAR; INTRAVENOUS; SOFT TISSUE ONCE
Status: COMPLETED | OUTPATIENT
Start: 2025-05-22 | End: 2025-05-22

## 2025-05-22 RX ORDER — LORAZEPAM 2 MG/ML
0.5 INJECTION INTRAMUSCULAR ONCE
Status: COMPLETED | OUTPATIENT
Start: 2025-05-23 | End: 2025-05-22

## 2025-05-22 RX ORDER — MORPHINE SULFATE 4 MG/ML
4 INJECTION, SOLUTION INTRAMUSCULAR; INTRAVENOUS ONCE
Refills: 0 | Status: COMPLETED | OUTPATIENT
Start: 2025-05-23 | End: 2025-05-22

## 2025-05-22 RX ADMIN — KETOROLAC TROMETHAMINE 15 MG: 15 INJECTION, SOLUTION INTRAMUSCULAR; INTRAVENOUS at 23:29

## 2025-05-22 RX ADMIN — MORPHINE SULFATE 4 MG: 4 INJECTION, SOLUTION INTRAMUSCULAR; INTRAVENOUS at 23:29

## 2025-05-22 RX ADMIN — LORAZEPAM 0.5 MG: 2 INJECTION INTRAMUSCULAR; INTRAVENOUS at 23:55

## 2025-05-22 RX ADMIN — DEXAMETHASONE SODIUM PHOSPHATE 4 MG: 4 INJECTION INTRA-ARTICULAR; INTRALESIONAL; INTRAMUSCULAR; INTRAVENOUS; SOFT TISSUE at 23:29

## 2025-05-22 RX ADMIN — MORPHINE SULFATE 4 MG: 4 INJECTION, SOLUTION INTRAMUSCULAR; INTRAVENOUS at 23:56

## 2025-05-22 ASSESSMENT — PAIN - FUNCTIONAL ASSESSMENT: PAIN_FUNCTIONAL_ASSESSMENT: 0-10

## 2025-05-22 ASSESSMENT — PAIN SCALES - GENERAL
PAINLEVEL_OUTOF10: 10
PAINLEVEL_OUTOF10: 10

## 2025-05-23 ENCOUNTER — APPOINTMENT (OUTPATIENT)
Age: 80
DRG: 321 | End: 2025-05-23
Attending: INTERNAL MEDICINE
Payer: MEDICARE

## 2025-05-23 ENCOUNTER — HOSPITAL ENCOUNTER (INPATIENT)
Age: 80
LOS: 4 days | Discharge: HOME | DRG: 321 | End: 2025-05-27
Attending: HOSPITALIST | Admitting: FAMILY MEDICINE
Payer: MEDICARE

## 2025-05-23 ENCOUNTER — APPOINTMENT (OUTPATIENT)
Dept: GENERAL RADIOLOGY | Age: 80
DRG: 321 | End: 2025-05-23
Payer: MEDICARE

## 2025-05-23 VITALS
BODY MASS INDEX: 25.35 KG/M2 | TEMPERATURE: 98.8 F | RESPIRATION RATE: 20 BRPM | HEIGHT: 70 IN | HEART RATE: 99 BPM | SYSTOLIC BLOOD PRESSURE: 132 MMHG | OXYGEN SATURATION: 98 % | DIASTOLIC BLOOD PRESSURE: 71 MMHG | WEIGHT: 177.1 LBS

## 2025-05-23 DIAGNOSIS — I24.9 ACUTE CORONARY SYNDROME (HCC): ICD-10-CM

## 2025-05-23 DIAGNOSIS — Z95.5 STATUS POST CORONARY ARTERY STENT PLACEMENT: Primary | ICD-10-CM

## 2025-05-23 PROBLEM — I25.10 CAD, MULTIPLE VESSEL: Status: ACTIVE | Noted: 2025-05-23

## 2025-05-23 PROBLEM — I21.3 STEMI (ST ELEVATION MYOCARDIAL INFARCTION) (HCC): Status: ACTIVE | Noted: 2025-05-23

## 2025-05-23 PROBLEM — I21.19 ACUTE ST ELEVATION MYOCARDIAL INFARCTION (STEMI) OF INFEROLATERAL WALL (HCC): Status: ACTIVE | Noted: 2025-05-23

## 2025-05-23 PROBLEM — R79.89 ELEVATED TROPONIN: Status: ACTIVE | Noted: 2025-05-23

## 2025-05-23 PROBLEM — H40.9 GLAUCOMA: Status: ACTIVE | Noted: 2025-05-23

## 2025-05-23 LAB
ANION GAP SERPL CALCULATED.3IONS-SCNC: 10 MMOL/L (ref 9–16)
ANION GAP SERPL CALCULATED.3IONS-SCNC: 14 MMOL/L (ref 9–16)
ANTI-XA UNFRAC HEPARIN: <0.1 IU/L (ref 0.3–0.7)
BASOPHILS # BLD: 0.04 K/UL (ref 0–0.2)
BASOPHILS NFR BLD: 0 % (ref 0–2)
BUN SERPL-MCNC: 18 MG/DL (ref 8–23)
BUN SERPL-MCNC: 19 MG/DL (ref 8–23)
CALCIUM SERPL-MCNC: 10.1 MG/DL (ref 8.8–10.2)
CALCIUM SERPL-MCNC: 8.3 MG/DL (ref 8.8–10.2)
CHLORIDE SERPL-SCNC: 103 MMOL/L (ref 98–107)
CHLORIDE SERPL-SCNC: 108 MMOL/L (ref 98–107)
CHOLEST SERPL-MCNC: 184 MG/DL (ref 0–199)
CHOLESTEROL/HDL RATIO: 3.7
CO2 SERPL-SCNC: 21 MMOL/L (ref 20–31)
CO2 SERPL-SCNC: 23 MMOL/L (ref 20–31)
CREAT SERPL-MCNC: 1 MG/DL (ref 0.7–1.2)
CREAT SERPL-MCNC: 1.1 MG/DL (ref 0.7–1.2)
ECHO AO ROOT DIAM: 2.7 CM
ECHO AO ROOT INDEX: 1.36 CM/M2
ECHO AV PEAK GRADIENT: 6 MMHG
ECHO AV PEAK VELOCITY: 1.2 M/S
ECHO BSA: 1.98 M2
ECHO BSA: 1.99 M2
ECHO EST RA PRESSURE: 3 MMHG
ECHO LA AREA 2C: 14 CM2
ECHO LA AREA 4C: 17.7 CM2
ECHO LA DIAMETER INDEX: 1.41 CM/M2
ECHO LA DIAMETER: 2.8 CM
ECHO LA MAJOR AXIS: 5.4 CM
ECHO LA MINOR AXIS: 4.4 CM
ECHO LA TO AORTIC ROOT RATIO: 1.04
ECHO LA VOL BP: 45 ML (ref 18–58)
ECHO LA VOL MOD A2C: 36 ML (ref 18–58)
ECHO LA VOL MOD A4C: 46 ML (ref 18–58)
ECHO LA VOL/BSA BIPLANE: 23 ML/M2 (ref 16–34)
ECHO LA VOLUME INDEX MOD A2C: 18 ML/M2 (ref 16–34)
ECHO LA VOLUME INDEX MOD A4C: 23 ML/M2 (ref 16–34)
ECHO LV E' LATERAL VELOCITY: 5.55 CM/S
ECHO LV E' SEPTAL VELOCITY: 4.35 CM/S
ECHO LV EDV A2C: 74 ML
ECHO LV EDV A4C: 84 ML
ECHO LV EDV INDEX A4C: 42 ML/M2
ECHO LV EDV NDEX A2C: 37 ML/M2
ECHO LV EF PHYSICIAN: 45 %
ECHO LV EJECTION FRACTION A2C: 56 %
ECHO LV EJECTION FRACTION A4C: 49 %
ECHO LV EJECTION FRACTION BIPLANE: 53 % (ref 55–100)
ECHO LV ESV A2C: 33 ML
ECHO LV ESV A4C: 43 ML
ECHO LV ESV INDEX A2C: 17 ML/M2
ECHO LV ESV INDEX A4C: 22 ML/M2
ECHO LV FRACTIONAL SHORTENING: 24 % (ref 28–44)
ECHO LV INTERNAL DIMENSION DIASTOLE INDEX: 2.32 CM/M2
ECHO LV INTERNAL DIMENSION DIASTOLIC: 4.6 CM (ref 4.2–5.9)
ECHO LV INTERNAL DIMENSION SYSTOLIC INDEX: 1.77 CM/M2
ECHO LV INTERNAL DIMENSION SYSTOLIC: 3.5 CM
ECHO LV IVSD: 1.1 CM (ref 0.6–1)
ECHO LV MASS 2D: 181.2 G (ref 88–224)
ECHO LV MASS INDEX 2D: 91.5 G/M2 (ref 49–115)
ECHO LV POSTERIOR WALL DIASTOLIC: 1.1 CM (ref 0.6–1)
ECHO LV RELATIVE WALL THICKNESS RATIO: 0.48
ECHO MV A VELOCITY: 0.96 M/S
ECHO MV E DECELERATION TIME (DT): 53 MS
ECHO MV E VELOCITY: 0.48 M/S
ECHO MV E/A RATIO: 0.5
ECHO MV E/E' LATERAL: 8.65
ECHO MV E/E' RATIO (AVERAGED): 9.84
ECHO MV E/E' SEPTAL: 11.03
ECHO RIGHT VENTRICULAR SYSTOLIC PRESSURE (RVSP): 10 MMHG
ECHO TV REGURGITANT MAX VELOCITY: 1.28 M/S
ECHO TV REGURGITANT PEAK GRADIENT: 7 MMHG
EKG ATRIAL RATE: 116 BPM
EKG ATRIAL RATE: 122 BPM
EKG P AXIS: 102 DEGREES
EKG P AXIS: 92 DEGREES
EKG P-R INTERVAL: 176 MS
EKG P-R INTERVAL: 182 MS
EKG Q-T INTERVAL: 298 MS
EKG Q-T INTERVAL: 306 MS
EKG Q-T INTERVAL: 338 MS
EKG QRS DURATION: 74 MS
EKG QRS DURATION: 78 MS
EKG QRS DURATION: 86 MS
EKG QTC CALCULATION (BAZETT): 424 MS
EKG QTC CALCULATION (BAZETT): 424 MS
EKG QTC CALCULATION (BAZETT): 425 MS
EKG R AXIS: 11 DEGREES
EKG R AXIS: 70 DEGREES
EKG R AXIS: 82 DEGREES
EKG T AXIS: -13 DEGREES
EKG T AXIS: 0 DEGREES
EKG T AXIS: 120 DEGREES
EKG VENTRICULAR RATE: 116 BPM
EKG VENTRICULAR RATE: 122 BPM
EKG VENTRICULAR RATE: 95 BPM
EOSINOPHIL # BLD: 0.23 K/UL (ref 0–0.44)
EOSINOPHILS RELATIVE PERCENT: 2 % (ref 1–4)
ERYTHROCYTE [DISTWIDTH] IN BLOOD BY AUTOMATED COUNT: 13.2 % (ref 11.8–14.4)
ERYTHROCYTE [DISTWIDTH] IN BLOOD BY AUTOMATED COUNT: 13.3 % (ref 11.8–14.4)
GFR, ESTIMATED: 71 ML/MIN/1.73M2
GFR, ESTIMATED: 75 ML/MIN/1.73M2
GLUCOSE SERPL-MCNC: 119 MG/DL (ref 82–115)
GLUCOSE SERPL-MCNC: 165 MG/DL (ref 82–115)
HCT VFR BLD AUTO: 38.9 % (ref 40.7–50.3)
HCT VFR BLD AUTO: 46 % (ref 40.7–50.3)
HDLC SERPL-MCNC: 50 MG/DL
HGB BLD-MCNC: 13.9 G/DL (ref 13–17)
HGB BLD-MCNC: 16.4 G/DL (ref 13–17)
IMM GRANULOCYTES # BLD AUTO: 0.05 K/UL (ref 0–0.3)
IMM GRANULOCYTES NFR BLD: 1 %
INR PPP: 1
LDLC SERPL CALC-MCNC: 120 MG/DL (ref 0–100)
LYMPHOCYTES NFR BLD: 1.62 K/UL (ref 1.1–3.7)
LYMPHOCYTES RELATIVE PERCENT: 16 % (ref 24–43)
MCH RBC QN AUTO: 30.8 PG (ref 25.2–33.5)
MCH RBC QN AUTO: 31.1 PG (ref 25.2–33.5)
MCHC RBC AUTO-ENTMCNC: 35.7 G/DL (ref 28.4–34.8)
MCHC RBC AUTO-ENTMCNC: 35.7 G/DL (ref 28.4–34.8)
MCV RBC AUTO: 86.3 FL (ref 82.6–102.9)
MCV RBC AUTO: 87 FL (ref 82.6–102.9)
MONOCYTES NFR BLD: 0.87 K/UL (ref 0.1–1.2)
MONOCYTES NFR BLD: 8 % (ref 3–12)
NEUTROPHILS NFR BLD: 73 % (ref 36–65)
NEUTS SEG NFR BLD: 7.57 K/UL (ref 1.5–8.1)
NRBC BLD-RTO: 0 PER 100 WBC
NRBC BLD-RTO: 0 PER 100 WBC
PARTIAL THROMBOPLASTIN TIME: 28.1 SEC (ref 23.9–33.8)
PLATELET # BLD AUTO: 228 K/UL (ref 138–453)
PLATELET # BLD AUTO: 294 K/UL (ref 138–453)
PMV BLD AUTO: 9.7 FL (ref 8.1–13.5)
PMV BLD AUTO: 9.8 FL (ref 8.1–13.5)
POTASSIUM SERPL-SCNC: 3.8 MMOL/L (ref 3.7–5.3)
POTASSIUM SERPL-SCNC: 4.1 MMOL/L (ref 3.7–5.3)
PROTHROMBIN TIME: 13.2 SEC (ref 11.5–14.2)
RBC # BLD AUTO: 4.47 M/UL (ref 4.21–5.77)
RBC # BLD AUTO: 5.33 M/UL (ref 4.21–5.77)
SODIUM SERPL-SCNC: 139 MMOL/L (ref 136–145)
SODIUM SERPL-SCNC: 140 MMOL/L (ref 136–145)
TRIGL SERPL-MCNC: 72 MG/DL
TROPONIN I SERPL HS-MCNC: 107 NG/L (ref 0–22)
TROPONIN I SERPL HS-MCNC: 113 NG/L (ref 0–22)
TROPONIN I SERPL HS-MCNC: 1932 NG/L (ref 0–22)
VLDLC SERPL CALC-MCNC: 14 MG/DL (ref 1–30)
WBC OTHER # BLD: 10.4 K/UL (ref 3.5–11.3)
WBC OTHER # BLD: 17.5 K/UL (ref 3.5–11.3)

## 2025-05-23 PROCEDURE — C8929 TTE W OR WO FOL WCON,DOPPLER: HCPCS

## 2025-05-23 PROCEDURE — 3E033PZ INTRODUCTION OF PLATELET INHIBITOR INTO PERIPHERAL VEIN, PERCUTANEOUS APPROACH: ICD-10-PCS | Performed by: INTERNAL MEDICINE

## 2025-05-23 PROCEDURE — 93458 L HRT ARTERY/VENTRICLE ANGIO: CPT | Performed by: INTERNAL MEDICINE

## 2025-05-23 PROCEDURE — 6370000000 HC RX 637 (ALT 250 FOR IP): Performed by: INTERNAL MEDICINE

## 2025-05-23 PROCEDURE — C1887 CATHETER, GUIDING: HCPCS | Performed by: INTERNAL MEDICINE

## 2025-05-23 PROCEDURE — 4A023N7 MEASUREMENT OF CARDIAC SAMPLING AND PRESSURE, LEFT HEART, PERCUTANEOUS APPROACH: ICD-10-PCS | Performed by: INTERNAL MEDICINE

## 2025-05-23 PROCEDURE — 2500000003 HC RX 250 WO HCPCS: Performed by: INTERNAL MEDICINE

## 2025-05-23 PROCEDURE — C9460 INJECTION, CANGRELOR: HCPCS | Performed by: INTERNAL MEDICINE

## 2025-05-23 PROCEDURE — 6360000004 HC RX CONTRAST MEDICATION: Performed by: INTERNAL MEDICINE

## 2025-05-23 PROCEDURE — 94761 N-INVAS EAR/PLS OXIMETRY MLT: CPT

## 2025-05-23 PROCEDURE — 99222 1ST HOSP IP/OBS MODERATE 55: CPT | Performed by: INTERNAL MEDICINE

## 2025-05-23 PROCEDURE — 96372 THER/PROPH/DIAG INJ SC/IM: CPT

## 2025-05-23 PROCEDURE — C1894 INTRO/SHEATH, NON-LASER: HCPCS | Performed by: INTERNAL MEDICINE

## 2025-05-23 PROCEDURE — 2580000003 HC RX 258: Performed by: INTERNAL MEDICINE

## 2025-05-23 PROCEDURE — 6370000000 HC RX 637 (ALT 250 FOR IP)

## 2025-05-23 PROCEDURE — 93010 ELECTROCARDIOGRAM REPORT: CPT | Performed by: INTERNAL MEDICINE

## 2025-05-23 PROCEDURE — 6360000002 HC RX W HCPCS: Performed by: INTERNAL MEDICINE

## 2025-05-23 PROCEDURE — 6360000002 HC RX W HCPCS: Performed by: EMERGENCY MEDICINE

## 2025-05-23 PROCEDURE — 96376 TX/PRO/DX INJ SAME DRUG ADON: CPT

## 2025-05-23 PROCEDURE — 2700000000 HC OXYGEN THERAPY PER DAY

## 2025-05-23 PROCEDURE — 027034Z DILATION OF CORONARY ARTERY, ONE ARTERY WITH DRUG-ELUTING INTRALUMINAL DEVICE, PERCUTANEOUS APPROACH: ICD-10-PCS | Performed by: INTERNAL MEDICINE

## 2025-05-23 PROCEDURE — 96375 TX/PRO/DX INJ NEW DRUG ADDON: CPT

## 2025-05-23 PROCEDURE — 6360000002 HC RX W HCPCS

## 2025-05-23 PROCEDURE — 92941 PRQ TRLML REVSC TOT OCCL AMI: CPT | Performed by: INTERNAL MEDICINE

## 2025-05-23 PROCEDURE — 2709999900 HC NON-CHARGEABLE SUPPLY: Performed by: INTERNAL MEDICINE

## 2025-05-23 PROCEDURE — 93306 TTE W/DOPPLER COMPLETE: CPT | Performed by: INTERNAL MEDICINE

## 2025-05-23 PROCEDURE — 96365 THER/PROPH/DIAG IV INF INIT: CPT

## 2025-05-23 PROCEDURE — B2151ZZ FLUOROSCOPY OF LEFT HEART USING LOW OSMOLAR CONTRAST: ICD-10-PCS | Performed by: INTERNAL MEDICINE

## 2025-05-23 PROCEDURE — 93005 ELECTROCARDIOGRAM TRACING: CPT | Performed by: EMERGENCY MEDICINE

## 2025-05-23 PROCEDURE — 2000000000 HC ICU R&B

## 2025-05-23 PROCEDURE — C1725 CATH, TRANSLUMIN NON-LASER: HCPCS | Performed by: INTERNAL MEDICINE

## 2025-05-23 PROCEDURE — 99223 1ST HOSP IP/OBS HIGH 75: CPT | Performed by: NURSE PRACTITIONER

## 2025-05-23 PROCEDURE — 99239 HOSP IP/OBS DSCHRG MGMT >30: CPT | Performed by: INTERNAL MEDICINE

## 2025-05-23 PROCEDURE — 94660 CPAP INITIATION&MGMT: CPT

## 2025-05-23 PROCEDURE — B2111ZZ FLUOROSCOPY OF MULTIPLE CORONARY ARTERIES USING LOW OSMOLAR CONTRAST: ICD-10-PCS | Performed by: INTERNAL MEDICINE

## 2025-05-23 PROCEDURE — 71045 X-RAY EXAM CHEST 1 VIEW: CPT

## 2025-05-23 PROCEDURE — 2500000003 HC RX 250 WO HCPCS: Performed by: EMERGENCY MEDICINE

## 2025-05-23 PROCEDURE — 85027 COMPLETE CBC AUTOMATED: CPT

## 2025-05-23 PROCEDURE — C9606 PERC D-E COR REVASC W AMI S: HCPCS | Performed by: INTERNAL MEDICINE

## 2025-05-23 PROCEDURE — 6370000000 HC RX 637 (ALT 250 FOR IP): Performed by: NURSE PRACTITIONER

## 2025-05-23 PROCEDURE — 80061 LIPID PANEL: CPT

## 2025-05-23 PROCEDURE — 36415 COLL VENOUS BLD VENIPUNCTURE: CPT

## 2025-05-23 PROCEDURE — B31H1ZZ FLUOROSCOPY OF RIGHT UPPER EXTREMITY ARTERIES USING LOW OSMOLAR CONTRAST: ICD-10-PCS | Performed by: INTERNAL MEDICINE

## 2025-05-23 PROCEDURE — 76937 US GUIDE VASCULAR ACCESS: CPT | Performed by: INTERNAL MEDICINE

## 2025-05-23 PROCEDURE — C1874 STENT, COATED/COV W/DEL SYS: HCPCS | Performed by: INTERNAL MEDICINE

## 2025-05-23 PROCEDURE — C1769 GUIDE WIRE: HCPCS | Performed by: INTERNAL MEDICINE

## 2025-05-23 PROCEDURE — 93005 ELECTROCARDIOGRAM TRACING: CPT | Performed by: INTERNAL MEDICINE

## 2025-05-23 DEVICE — STENT CORONARY ONYX FRONTIER RX 3X34 MM ZOTAROLIMUS ELUT: Type: IMPLANTABLE DEVICE | Status: FUNCTIONAL

## 2025-05-23 RX ORDER — CARVEDILOL 12.5 MG/1
12.5 TABLET ORAL 2 TIMES DAILY WITH MEALS
Status: DISCONTINUED | OUTPATIENT
Start: 2025-05-23 | End: 2025-05-23

## 2025-05-23 RX ORDER — HEPARIN SODIUM 10000 [USP'U]/100ML
5-30 INJECTION, SOLUTION INTRAVENOUS CONTINUOUS
Status: DISCONTINUED | OUTPATIENT
Start: 2025-05-23 | End: 2025-05-23

## 2025-05-23 RX ORDER — HEPARIN SODIUM 1000 [USP'U]/ML
4000 INJECTION, SOLUTION INTRAVENOUS; SUBCUTANEOUS PRN
Status: DISCONTINUED | OUTPATIENT
Start: 2025-05-23 | End: 2025-05-23

## 2025-05-23 RX ORDER — CARVEDILOL 25 MG/1
25 TABLET ORAL 2 TIMES DAILY WITH MEALS
Status: DISCONTINUED | OUTPATIENT
Start: 2025-05-24 | End: 2025-05-25

## 2025-05-23 RX ORDER — LATANOPROST 50 UG/ML
1 SOLUTION/ DROPS OPHTHALMIC NIGHTLY
Status: DISCONTINUED | OUTPATIENT
Start: 2025-05-23 | End: 2025-05-27 | Stop reason: HOSPADM

## 2025-05-23 RX ORDER — ATORVASTATIN CALCIUM 80 MG/1
80 TABLET, FILM COATED ORAL NIGHTLY
Status: DISCONTINUED | OUTPATIENT
Start: 2025-05-23 | End: 2025-05-23 | Stop reason: HOSPADM

## 2025-05-23 RX ORDER — SODIUM CHLORIDE 0.9 % (FLUSH) 0.9 %
5-40 SYRINGE (ML) INJECTION PRN
Status: DISCONTINUED | OUTPATIENT
Start: 2025-05-23 | End: 2025-05-23 | Stop reason: HOSPADM

## 2025-05-23 RX ORDER — CARVEDILOL 12.5 MG/1
12.5 TABLET ORAL ONCE
Status: COMPLETED | OUTPATIENT
Start: 2025-05-23 | End: 2025-05-23

## 2025-05-23 RX ORDER — BIVALIRUDIN 250 MG/5ML
INJECTION, POWDER, LYOPHILIZED, FOR SOLUTION INTRAVENOUS PRN
Status: DISCONTINUED | OUTPATIENT
Start: 2025-05-23 | End: 2025-05-23 | Stop reason: HOSPADM

## 2025-05-23 RX ORDER — ACETAMINOPHEN 325 MG/1
650 TABLET ORAL EVERY 6 HOURS PRN
Status: DISCONTINUED | OUTPATIENT
Start: 2025-05-23 | End: 2025-05-27 | Stop reason: HOSPADM

## 2025-05-23 RX ORDER — ACETAMINOPHEN 650 MG/1
650 SUPPOSITORY RECTAL EVERY 6 HOURS PRN
Status: DISCONTINUED | OUTPATIENT
Start: 2025-05-23 | End: 2025-05-27 | Stop reason: HOSPADM

## 2025-05-23 RX ORDER — MAGNESIUM SULFATE 1 G/100ML
1000 INJECTION INTRAVENOUS PRN
Status: DISCONTINUED | OUTPATIENT
Start: 2025-05-23 | End: 2025-05-27 | Stop reason: HOSPADM

## 2025-05-23 RX ORDER — SODIUM CHLORIDE 0.9 % (FLUSH) 0.9 %
5-40 SYRINGE (ML) INJECTION EVERY 12 HOURS SCHEDULED
Status: DISCONTINUED | OUTPATIENT
Start: 2025-05-23 | End: 2025-05-27 | Stop reason: HOSPADM

## 2025-05-23 RX ORDER — NITROGLYCERIN 20 MG/100ML
5-200 INJECTION INTRAVENOUS CONTINUOUS
Status: DISCONTINUED | OUTPATIENT
Start: 2025-05-23 | End: 2025-05-24

## 2025-05-23 RX ORDER — DIPHENHYDRAMINE HYDROCHLORIDE 50 MG/ML
25 INJECTION, SOLUTION INTRAMUSCULAR; INTRAVENOUS ONCE
Status: COMPLETED | OUTPATIENT
Start: 2025-05-23 | End: 2025-05-23

## 2025-05-23 RX ORDER — METOPROLOL TARTRATE 1 MG/ML
5 INJECTION, SOLUTION INTRAVENOUS ONCE
Status: COMPLETED | OUTPATIENT
Start: 2025-05-23 | End: 2025-05-23

## 2025-05-23 RX ORDER — POTASSIUM CHLORIDE 7.45 MG/ML
10 INJECTION INTRAVENOUS PRN
Status: DISCONTINUED | OUTPATIENT
Start: 2025-05-23 | End: 2025-05-27 | Stop reason: HOSPADM

## 2025-05-23 RX ORDER — HEPARIN SODIUM 1000 [USP'U]/ML
INJECTION, SOLUTION INTRAVENOUS; SUBCUTANEOUS
Status: COMPLETED
Start: 2025-05-23 | End: 2025-05-23

## 2025-05-23 RX ORDER — POTASSIUM CHLORIDE 1500 MG/1
40 TABLET, EXTENDED RELEASE ORAL PRN
Status: DISCONTINUED | OUTPATIENT
Start: 2025-05-23 | End: 2025-05-27 | Stop reason: HOSPADM

## 2025-05-23 RX ORDER — ATORVASTATIN CALCIUM 80 MG/1
80 TABLET, FILM COATED ORAL NIGHTLY
Status: DISCONTINUED | OUTPATIENT
Start: 2025-05-23 | End: 2025-05-27 | Stop reason: HOSPADM

## 2025-05-23 RX ORDER — SODIUM CHLORIDE 9 MG/ML
INJECTION, SOLUTION INTRAVENOUS PRN
Status: DISCONTINUED | OUTPATIENT
Start: 2025-05-23 | End: 2025-05-23 | Stop reason: HOSPADM

## 2025-05-23 RX ORDER — LOSARTAN POTASSIUM 50 MG/1
25 TABLET ORAL DAILY
Status: DISCONTINUED | OUTPATIENT
Start: 2025-05-24 | End: 2025-05-27 | Stop reason: HOSPADM

## 2025-05-23 RX ORDER — SODIUM CHLORIDE 9 MG/ML
INJECTION, SOLUTION INTRAVENOUS CONTINUOUS
Status: DISCONTINUED | OUTPATIENT
Start: 2025-05-23 | End: 2025-05-23 | Stop reason: HOSPADM

## 2025-05-23 RX ORDER — MORPHINE SULFATE 4 MG/ML
4 INJECTION, SOLUTION INTRAMUSCULAR; INTRAVENOUS EVERY 4 HOURS PRN
Status: DISCONTINUED | OUTPATIENT
Start: 2025-05-23 | End: 2025-05-27 | Stop reason: HOSPADM

## 2025-05-23 RX ORDER — HALOPERIDOL 5 MG/ML
5 INJECTION INTRAMUSCULAR ONCE
Status: COMPLETED | OUTPATIENT
Start: 2025-05-23 | End: 2025-05-23

## 2025-05-23 RX ORDER — ASPIRIN 81 MG/1
81 TABLET ORAL DAILY
Status: DISCONTINUED | OUTPATIENT
Start: 2025-05-24 | End: 2025-05-23 | Stop reason: HOSPADM

## 2025-05-23 RX ORDER — NITROGLYCERIN 20 MG/100ML
5-200 INJECTION INTRAVENOUS CONTINUOUS
Status: DISCONTINUED | OUTPATIENT
Start: 2025-05-23 | End: 2025-05-23

## 2025-05-23 RX ORDER — NITROGLYCERIN 20 MG/100ML
INJECTION INTRAVENOUS PRN
Status: DISCONTINUED | OUTPATIENT
Start: 2025-05-23 | End: 2025-05-23 | Stop reason: HOSPADM

## 2025-05-23 RX ORDER — ATROPINE SULFATE 1 MG/ML
0.5 INJECTION, SOLUTION INTRAVENOUS
Status: DISCONTINUED | OUTPATIENT
Start: 2025-05-23 | End: 2025-05-23 | Stop reason: HOSPADM

## 2025-05-23 RX ORDER — AMOXICILLIN 500 MG/1
500 CAPSULE ORAL 3 TIMES DAILY
Status: ON HOLD | COMMUNITY
End: 2025-05-27 | Stop reason: HOSPADM

## 2025-05-23 RX ORDER — LATANOPROST 50 UG/ML
1 SOLUTION/ DROPS OPHTHALMIC NIGHTLY
Status: DISCONTINUED | OUTPATIENT
Start: 2025-05-23 | End: 2025-05-23 | Stop reason: HOSPADM

## 2025-05-23 RX ORDER — METOPROLOL TARTRATE 1 MG/ML
5 INJECTION, SOLUTION INTRAVENOUS EVERY 6 HOURS PRN
Status: DISCONTINUED | OUTPATIENT
Start: 2025-05-23 | End: 2025-05-27 | Stop reason: HOSPADM

## 2025-05-23 RX ORDER — HEPARIN SODIUM 1000 [USP'U]/ML
2000 INJECTION, SOLUTION INTRAVENOUS; SUBCUTANEOUS PRN
Status: DISCONTINUED | OUTPATIENT
Start: 2025-05-23 | End: 2025-05-23

## 2025-05-23 RX ORDER — DORZOLAMIDE HYDROCHLORIDE AND TIMOLOL MALEATE 20; 5 MG/ML; MG/ML
1 SOLUTION/ DROPS OPHTHALMIC 2 TIMES DAILY
Status: DISCONTINUED | OUTPATIENT
Start: 2025-05-23 | End: 2025-05-23 | Stop reason: HOSPADM

## 2025-05-23 RX ORDER — KETOROLAC TROMETHAMINE 15 MG/ML
15 INJECTION, SOLUTION INTRAMUSCULAR; INTRAVENOUS ONCE
Status: COMPLETED | OUTPATIENT
Start: 2025-05-23 | End: 2025-05-23

## 2025-05-23 RX ORDER — IOPAMIDOL 755 MG/ML
INJECTION, SOLUTION INTRAVASCULAR PRN
Status: DISCONTINUED | OUTPATIENT
Start: 2025-05-23 | End: 2025-05-23 | Stop reason: HOSPADM

## 2025-05-23 RX ORDER — CARVEDILOL 25 MG/1
25 TABLET ORAL 2 TIMES DAILY WITH MEALS
Status: DISCONTINUED | OUTPATIENT
Start: 2025-05-24 | End: 2025-05-23 | Stop reason: HOSPADM

## 2025-05-23 RX ORDER — SODIUM CHLORIDE 0.9 % (FLUSH) 0.9 %
10 SYRINGE (ML) INJECTION PRN
Status: DISCONTINUED | OUTPATIENT
Start: 2025-05-23 | End: 2025-05-27 | Stop reason: HOSPADM

## 2025-05-23 RX ORDER — ASPIRIN 81 MG/1
324 TABLET, CHEWABLE ORAL ONCE
Status: COMPLETED | OUTPATIENT
Start: 2025-05-23 | End: 2025-05-23

## 2025-05-23 RX ORDER — MORPHINE SULFATE 2 MG/ML
2 INJECTION, SOLUTION INTRAMUSCULAR; INTRAVENOUS EVERY 4 HOURS PRN
Refills: 0 | Status: DISCONTINUED | OUTPATIENT
Start: 2025-05-23 | End: 2025-05-27 | Stop reason: HOSPADM

## 2025-05-23 RX ORDER — DORZOLAMIDE HYDROCHLORIDE AND TIMOLOL MALEATE 20; 5 MG/ML; MG/ML
1 SOLUTION/ DROPS OPHTHALMIC 2 TIMES DAILY
Status: DISCONTINUED | OUTPATIENT
Start: 2025-05-23 | End: 2025-05-27 | Stop reason: HOSPADM

## 2025-05-23 RX ORDER — LIDOCAINE HYDROCHLORIDE 10 MG/ML
INJECTION, SOLUTION INFILTRATION; PERINEURAL PRN
Status: DISCONTINUED | OUTPATIENT
Start: 2025-05-23 | End: 2025-05-23 | Stop reason: HOSPADM

## 2025-05-23 RX ORDER — LOSARTAN POTASSIUM 25 MG/1
25 TABLET ORAL DAILY
Status: DISCONTINUED | OUTPATIENT
Start: 2025-05-23 | End: 2025-05-23 | Stop reason: HOSPADM

## 2025-05-23 RX ORDER — SODIUM CHLORIDE 9 MG/ML
INJECTION, SOLUTION INTRAVENOUS PRN
Status: DISCONTINUED | OUTPATIENT
Start: 2025-05-23 | End: 2025-05-27 | Stop reason: HOSPADM

## 2025-05-23 RX ORDER — CARVEDILOL 3.12 MG/1
3.12 TABLET ORAL 2 TIMES DAILY WITH MEALS
Status: DISCONTINUED | OUTPATIENT
Start: 2025-05-23 | End: 2025-05-23

## 2025-05-23 RX ORDER — ASPIRIN 81 MG/1
TABLET, CHEWABLE ORAL
Status: COMPLETED
Start: 2025-05-23 | End: 2025-05-23

## 2025-05-23 RX ORDER — NITROGLYCERIN 20 MG/100ML
5-200 INJECTION INTRAVENOUS CONTINUOUS
Status: DISCONTINUED | OUTPATIENT
Start: 2025-05-23 | End: 2025-05-23 | Stop reason: HOSPADM

## 2025-05-23 RX ORDER — HEPARIN SODIUM 1000 [USP'U]/ML
4000 INJECTION, SOLUTION INTRAVENOUS; SUBCUTANEOUS ONCE
Status: COMPLETED | OUTPATIENT
Start: 2025-05-23 | End: 2025-05-23

## 2025-05-23 RX ORDER — ASPIRIN 81 MG/1
81 TABLET ORAL DAILY
Status: DISCONTINUED | OUTPATIENT
Start: 2025-05-24 | End: 2025-05-27 | Stop reason: HOSPADM

## 2025-05-23 RX ORDER — ACETAMINOPHEN 325 MG/1
650 TABLET ORAL EVERY 4 HOURS PRN
Status: DISCONTINUED | OUTPATIENT
Start: 2025-05-23 | End: 2025-05-23 | Stop reason: HOSPADM

## 2025-05-23 RX ORDER — VITAMIN B COMPLEX
1000 TABLET ORAL DAILY
Status: DISCONTINUED | OUTPATIENT
Start: 2025-05-23 | End: 2025-05-23 | Stop reason: HOSPADM

## 2025-05-23 RX ORDER — FUROSEMIDE 10 MG/ML
20 INJECTION INTRAMUSCULAR; INTRAVENOUS ONCE
Status: COMPLETED | OUTPATIENT
Start: 2025-05-23 | End: 2025-05-23

## 2025-05-23 RX ORDER — SODIUM CHLORIDE 0.9 % (FLUSH) 0.9 %
5-40 SYRINGE (ML) INJECTION EVERY 12 HOURS SCHEDULED
Status: DISCONTINUED | OUTPATIENT
Start: 2025-05-23 | End: 2025-05-23 | Stop reason: HOSPADM

## 2025-05-23 RX ORDER — VITAMIN B COMPLEX
1000 TABLET ORAL DAILY
Status: DISCONTINUED | OUTPATIENT
Start: 2025-05-24 | End: 2025-05-27 | Stop reason: HOSPADM

## 2025-05-23 RX ORDER — ONDANSETRON 2 MG/ML
4 INJECTION INTRAMUSCULAR; INTRAVENOUS EVERY 6 HOURS PRN
Status: DISCONTINUED | OUTPATIENT
Start: 2025-05-23 | End: 2025-05-27 | Stop reason: HOSPADM

## 2025-05-23 RX ORDER — ONDANSETRON 4 MG/1
4 TABLET, ORALLY DISINTEGRATING ORAL EVERY 8 HOURS PRN
Status: DISCONTINUED | OUTPATIENT
Start: 2025-05-23 | End: 2025-05-27 | Stop reason: HOSPADM

## 2025-05-23 RX ADMIN — LATANOPROST 1 DROP: 50 SOLUTION OPHTHALMIC at 22:32

## 2025-05-23 RX ADMIN — CANGRELOR 4 MCG/KG/MIN: 50 INJECTION, POWDER, LYOPHILIZED, FOR SOLUTION INTRAVENOUS at 22:50

## 2025-05-23 RX ADMIN — CANGRELOR 4 MCG/KG/MIN: 50 INJECTION, POWDER, LYOPHILIZED, FOR SOLUTION INTRAVENOUS at 03:02

## 2025-05-23 RX ADMIN — SULFUR HEXAFLUORIDE 2 ML: KIT at 11:25

## 2025-05-23 RX ADMIN — CARVEDILOL 12.5 MG: 12.5 TABLET, FILM COATED ORAL at 23:04

## 2025-05-23 RX ADMIN — FUROSEMIDE 20 MG: 10 INJECTION, SOLUTION INTRAMUSCULAR; INTRAVENOUS at 04:12

## 2025-05-23 RX ADMIN — SODIUM CHLORIDE, PRESERVATIVE FREE 10 ML: 5 INJECTION INTRAVENOUS at 11:25

## 2025-05-23 RX ADMIN — HALOPERIDOL LACTATE 5 MG: 5 INJECTION, SOLUTION INTRAMUSCULAR at 00:29

## 2025-05-23 RX ADMIN — HEPARIN SODIUM 4000 UNITS: 1000 INJECTION, SOLUTION INTRAVENOUS; SUBCUTANEOUS at 01:28

## 2025-05-23 RX ADMIN — ACETAMINOPHEN 650 MG: 325 TABLET ORAL at 15:53

## 2025-05-23 RX ADMIN — CANGRELOR 4 MCG/KG/MIN: 50 INJECTION, POWDER, LYOPHILIZED, FOR SOLUTION INTRAVENOUS at 19:37

## 2025-05-23 RX ADMIN — CANGRELOR 4 MCG/KG/MIN: 50 INJECTION, POWDER, LYOPHILIZED, FOR SOLUTION INTRAVENOUS at 10:39

## 2025-05-23 RX ADMIN — HEPARIN SODIUM 12 UNITS/KG/HR: 10000 INJECTION, SOLUTION INTRAVENOUS at 01:33

## 2025-05-23 RX ADMIN — CANGRELOR 4 MCG/KG/MIN: 50 INJECTION, POWDER, LYOPHILIZED, FOR SOLUTION INTRAVENOUS at 16:46

## 2025-05-23 RX ADMIN — METOPROLOL TARTRATE 2.5 MG: 5 INJECTION INTRAVENOUS at 00:37

## 2025-05-23 RX ADMIN — DIPHENHYDRAMINE HYDROCHLORIDE 25 MG: 50 INJECTION INTRAMUSCULAR; INTRAVENOUS at 00:29

## 2025-05-23 RX ADMIN — Medication 1000 UNITS: at 09:05

## 2025-05-23 RX ADMIN — CANGRELOR 4 MCG/KG/MIN: 50 INJECTION, POWDER, LYOPHILIZED, FOR SOLUTION INTRAVENOUS at 07:52

## 2025-05-23 RX ADMIN — DORZOLAMIDE HYDROCHLORIDE AND TIMOLOL MALEATE 1 DROP: 20; 5 SOLUTION/ DROPS OPHTHALMIC at 22:32

## 2025-05-23 RX ADMIN — LOSARTAN POTASSIUM 25 MG: 25 TABLET, FILM COATED ORAL at 09:05

## 2025-05-23 RX ADMIN — CARVEDILOL 12.5 MG: 12.5 TABLET, FILM COATED ORAL at 09:05

## 2025-05-23 RX ADMIN — CARVEDILOL 12.5 MG: 12.5 TABLET, FILM COATED ORAL at 18:23

## 2025-05-23 RX ADMIN — ATORVASTATIN CALCIUM 80 MG: 80 TABLET, FILM COATED ORAL at 22:27

## 2025-05-23 RX ADMIN — CANGRELOR 4 MCG/KG/MIN: 50 INJECTION, POWDER, LYOPHILIZED, FOR SOLUTION INTRAVENOUS at 13:59

## 2025-05-23 RX ADMIN — ACETAMINOPHEN 650 MG: 325 TABLET ORAL at 22:27

## 2025-05-23 RX ADMIN — NITROGLYCERIN 5 MCG/MIN: 20 INJECTION INTRAVENOUS at 01:02

## 2025-05-23 RX ADMIN — CANGRELOR 4 MCG/KG/MIN: 50 INJECTION, POWDER, LYOPHILIZED, FOR SOLUTION INTRAVENOUS at 04:58

## 2025-05-23 RX ADMIN — ASPIRIN 324 MG: 81 TABLET, CHEWABLE ORAL at 01:06

## 2025-05-23 RX ADMIN — HEPARIN SODIUM 4000 UNITS: 1000 INJECTION INTRAVENOUS; SUBCUTANEOUS at 01:28

## 2025-05-23 RX ADMIN — KETOROLAC TROMETHAMINE 15 MG: 15 INJECTION, SOLUTION INTRAMUSCULAR; INTRAVENOUS at 00:29

## 2025-05-23 ASSESSMENT — PAIN DESCRIPTION - ORIENTATION: ORIENTATION: MID

## 2025-05-23 ASSESSMENT — PAIN SCALES - GENERAL
PAINLEVEL_OUTOF10: 10
PAINLEVEL_OUTOF10: 5
PAINLEVEL_OUTOF10: 3
PAINLEVEL_OUTOF10: 3
PAINLEVEL_OUTOF10: 0
PAINLEVEL_OUTOF10: 0
PAINLEVEL_OUTOF10: 10

## 2025-05-23 ASSESSMENT — PAIN DESCRIPTION - LOCATION
LOCATION: HEAD
LOCATION: CHEST

## 2025-05-23 ASSESSMENT — PAIN - FUNCTIONAL ASSESSMENT
PAIN_FUNCTIONAL_ASSESSMENT: ACTIVITIES ARE NOT PREVENTED
PAIN_FUNCTIONAL_ASSESSMENT: ACTIVITIES ARE NOT PREVENTED

## 2025-05-23 ASSESSMENT — PAIN DESCRIPTION - DESCRIPTORS: DESCRIPTORS: ACHING;POUNDING;DISCOMFORT

## 2025-05-23 NOTE — PROGRESS NOTES
Report given to mobile ICU transport. Care relinquished to mobile ICU. Patient aware of transport, explained what happened during cath last night, patient states he cannot remember. A & O x4. Verbalizes understanding. Allison Sebastian RN

## 2025-05-23 NOTE — ED PROVIDER NOTES
EMERGENCY DEPARTMENT ENCOUNTER    Pt Name: Joel Guzmán  MRN: [id number]  Birthdate 1945  Date of evaluation: 5/22/25  CHIEF COMPLAINT       Chief Complaint   Patient presents with    Back Pain     Patient states he took tylenol and muscle relaxer and antibiotic for his tooth. Patient states he has been dx with possible pinched nerve. Chest pain, back pain, neck pain      HISTORY OF PRESENT ILLNESS   The history is provided by the patient and medical records.    The patient is a 80-year-old male with history of CAD, GERD, hypertension who presents to the ED for back pain.  Patient was suffering from a \"pinched nerve \" in his neck since April 12 and symptoms gradually worsened this evening.  Pain is on left neck and radiates down left arm.  Denies chest pain.  No shortness of breath.  No fevers.  No numbness or tingling.  No weakness.    REVIEW OF SYSTEMS     Review of Systems  All other systems reviewed and are negative.    PASTMEDICAL HISTORY     Past Medical History:   Diagnosis Date    Allergic rhinitis     Coronary artery disease     GERD without esophagitis     Glaucoma     Hypertension      Past Problem List  Patient Active Problem List   Diagnosis Code    Essential hypertension I10    Vitamin D deficiency E55.9    SOB (shortness of breath) R06.02    Acute cholecystitis K81.0    Hypertension I10    GERD without esophagitis K21.9    Elevated blood pressure reading R03.0    Coronary artery disease I25.10    S/P laparoscopic cholecystectomy Z90.49    Mixed hyperlipidemia E78.2    Hypophosphatemia E83.39    Chest pain R07.9    Acute ST elevation myocardial infarction (STEMI) of inferolateral wall (HCC) I21.19    STEMI (ST elevation myocardial infarction) (HCC) I21.3    CAD, multiple vessel I25.10    Elevated troponin R79.89    Glaucoma H40.9     SURGICAL HISTORY       Past Surgical History:   Procedure Laterality Date    CARDIAC CATHETERIZATION  2006    [facility]    CHOLECYSTECTOMY, LAPAROSCOPIC N/A 12/3/2020

## 2025-05-23 NOTE — PLAN OF CARE
Problem: Discharge Planning  Goal: Discharge to home or other facility with appropriate resources  Outcome: Progressing  Flowsheets (Taken 5/23/2025 0317 by Allison Sebastian, RN)  Discharge to home or other facility with appropriate resources:   Identify barriers to discharge with patient and caregiver   Arrange for needed discharge resources and transportation as appropriate   Identify discharge learning needs (meds, wound care, etc)   Refer to discharge planning if patient needs post-hospital services based on physician order or complex needs related to functional status, cognitive ability or social support system     Problem: Pain  Goal: Verbalizes/displays adequate comfort level or baseline comfort level  Outcome: Progressing     Problem: Safety - Adult  Goal: Free from fall injury  Outcome: Progressing     Problem: ABCDS Injury Assessment  Goal: Absence of physical injury  Outcome: Progressing

## 2025-05-23 NOTE — H&P
0.6 - 1.0 cm    LVIDd 4.6 4.2 - 5.9 cm    LVIDs 3.5 cm    LVPWd 1.1 (A) 0.6 - 1.0 cm    LV E' Lateral Velocity 5.55 cm/s    LV E' Septal Velocity 4.35 cm/s    LV Ejection Fraction A2C 56 %    LV Ejection Fraction A4C 49 %    EF BP 53 (A) 55 - 100 %    LA Minor Axis 4.4 cm    LA Major Axis 5.4 cm    LA Area 2C 14.0 cm2    LA Area 4C 17.7 cm2    LA Volume MOD A2C 36 18 - 58 mL    LA Volume MOD A4C 46 18 - 58 mL    LA Volume BP 45 18 - 58 mL    LA Diameter 2.8 cm    AV Peak Velocity 1.2 m/s    AV Peak Gradient 6 mmHg    Aortic Root 2.7 cm    MV E Wave Deceleration Time 53.0 ms    MV A Velocity 0.96 m/s    MV E Velocity 0.48 m/s    Est. RA Pressure 3 mmHg    TR Max Velocity 1.28 m/s    TR Peak Gradient 7 mmHg    Body Surface Area 1.99 m2    Fractional Shortening 2D 24 28 - 44 %    LV ESV Index A4C 22 mL/m2    LV EDV Index A4C 42 mL/m2    LV ESV Index A2C 17 mL/m2    LV EDV Index A2C 37 mL/m2    LVIDd Index 2.32 cm/m2    LVIDs Index 1.77 cm/m2    LV RWT Ratio 0.48     LV Mass 2D 181.2 88 - 224 g    LV Mass 2D Index 91.5 49 - 115 g/m2    MV E/A 0.50     E/E' Ratio (Averaged) 9.84     E/E' Lateral 8.65     E/E' Septal 11.03     LA Volume Index BP 23 16 - 34 ml/m2    LA Volume Index MOD A2C 18 16 - 34 ml/m2    LA Volume Index MOD A4C 23 16 - 34 ml/m2    LA Size Index 1.41 cm/m2    LA/AO Root Ratio 1.04     Ao Root Index 1.36 cm/m2    RVSP 10 mmHg    EF Physician 45 %       Imaging/Diagnostics:  XR CHEST PORTABLE  Result Date: 5/23/2025  No acute cardiopulmonary process.       Assessment :      Hospital Problems           Last Modified POA    Acute ST elevation myocardial infarction (STEMI) of inferolateral wall (HCC) 5/23/2025 Yes    Essential hypertension 5/23/2025 Yes    Vitamin D deficiency 5/23/2025 Yes    Hypertension 5/23/2025 Yes    GERD without esophagitis 5/23/2025 Yes    Mixed hyperlipidemia 5/23/2025 Yes    STEMI (ST elevation myocardial infarction) (HCC) 5/23/2025 Yes       Plan:     Patient status inpatient in

## 2025-05-23 NOTE — DISCHARGE SUMMARY
Cardiology Discharge Note         Name:  Joel Guzmán    YOB: 1945    Medical Record Number:  [id number]    Date of Admission:  5/22/2025    Date of Discharge:  5/23/2025    Admitting physician: Ramiro Lea MD    Discharge Attending: RAMIRO LEA MD, [facility]    Primary Care Physician: No primary care provider on file.    Consultants: None     Discharge to: [facility]    Discharge Condition: Stable     HOSPITAL ADMISSION PROBLEM LIST:  Patient Active Problem List   Diagnosis    Essential hypertension    Vitamin D deficiency    SOB (shortness of breath)    Acute cholecystitis    Hypertension    GERD without esophagitis    Elevated blood pressure reading    Coronary artery disease    S/P laparoscopic cholecystectomy    Mixed hyperlipidemia    Hypophosphatemia    Chest pain    Acute ST elevation myocardial infarction (STEMI) of inferolateral wall (HCC)    STEMI (ST elevation myocardial infarction) (HCC)         HOSPITAL COURSE :    This is a 80-year-old gentleman who presented the emergency room with complaints of chest pain and had inferior wall STEMI and underwent emergent PCI to RCA with drug-eluting stent.  He is also noted to have critical disease in the LAD and significant disease in the circumflex artery and the case has been discussed with cardiothoracic surgery for possible bypass versus PCI, therefore the patient is being transferred to [facility] for further management     Discharge exam:   Vitals:    05/23/25 0905   BP: 131/82   Pulse: (!) 122   Resp:    Temp:    SpO2:      Neuro: Alert and oriented   Chest: Clear to ausculation. No basilar rales or  wheezing.  Cardiac: RRR, no new murmur or gallop   Abdomen/groin: soft, non-tender, without masses or organomegaly  Lower extremity edema: none  Cardiac cath access site: soft, with no hematoma or bleeding/oozing     Labs:   CBC:       Recent Labs     05/22/25  2305 05/23/25  0744   WBC 10.4 17.5*   HGB 16.4  daily and cangrelor infusion for now  -keep potassium more than 4 and magnesium more than 2  -CT surgery consultation is requested for possible bypass versus PCI evaluation to LAD and left circumflex arteries     2. Dyslipidemia; continue atorvastatin     3. Essential hypertension; continue carvedilol and losartan     4. Glaucoma, GERD and other medical issues as per medicine        The case is discussed with nursing staff, medicine team and family at the bedside    Ramiro Sethi MD, [facility] [place]

## 2025-05-23 NOTE — BRIEF OP NOTE
Brief Postoperative Note      Patient: Joel Guzmán  YOB: 1945  MRN: [id number]    Date of Procedure: 5/23/2025    Pre-Op Diagnosis Codes:      * STEMI (ST elevation myocardial infarction) ([facility]) [I21.3]    Post-Op Diagnosis: STEMI       Procedure(s):  Left heart cath / coronary angiography  Percutaneous coronary intervention    Surgeon(s):  Roque Lewis MD    Assistant:  * No surgical staff found *    Anesthesia: IV Sedation    Estimated Blood Loss (mL): Minimal    Complications: None    Specimens:   * No specimens in log *    Implants:  Implant Name Type Inv. Item Serial No.  Lot No. LRB No. Used Action   STENT CORONARY PERICO FRONTIER RX [id number] MM ZOTAROLIMUS ELUT - [id number] Coronary stents STENT CORONARY PERICO FRONTIER RX [id number] MM ZOTAROLIMUS ELUT  [employer]WD [id number] N/A 1 Implanted         Drains: * No LDAs found *        Ultrasound-guided right radial 6 [place] arterial access.    Mid RCA culprit.  Severe three-vessel coronary artery disease.    Successful PTCA/CARLOS of mid RCA.    Normal LV systolic function with inferior wall hypokinesis.    IV cangrelor started in the Cath Lab.     Recommendations    Post PCI protocol recommended.    Patient management should include: Aggressive risk factor modification, aggressive medical management and optimal medical management.      Transfer of care to Dr. Sethi in AM.  IV cangrelor started for antiplatelet therapy.  Dr. Sethi (primary cardiologist) to determine need for urgent CABG vs multivessel PCI timing.  Obtain TTE  Wean IV NTG (patient was hypertensive upon arrival)         Electronically signed by Roque Lewis MD on 5/23/2025 at 3:07 AM

## 2025-05-23 NOTE — PROGRESS NOTES
4 Eyes Skin Assessment     NAME:  Joel Guzmán  YOB: 1945  MEDICAL RECORD NUMBER:  [id number]    The patient is being assessed for  Admission    I agree that at least one RN has performed a thorough Head to Toe Skin Assessment on the patient. ALL assessment sites listed below have been assessed.      Areas assessed by both nurses:    Head, Face, Ears, Shoulders, Back, Chest, Arms, Elbows, Hands, Sacrum. Buttock, Coccyx, Ischium, Legs. Feet and Heels, and Under Medical Devices         Does the Patient have a Wound? No noted wound(s)       Sher Prevention initiated by RN: Yes  Wound Care Orders initiated by RN: No    Pressure Injury (Stage 3,4, Unstageable, DTI, NWPT, and Complex wounds) if present, place Wound referral order by RN under : No    New Ostomies, if present place, Ostomy referral order under : No     Nurse 1 eSignature: Electronically signed by Allison Sebastian RN on 5/23/25 at 3:44 AM EDT    **SHARE this note so that the co-signing nurse can place an eSignature**    Nurse 2 eSignature: Electronically signed by Radha Alcaraz RN on 5/23/25 at 3:46 AM EDT

## 2025-05-23 NOTE — PROGRESS NOTES
End Of Shift Note  [facility]  Summary of shift: Patient did very well this shift. No complaints of chest pain or shortness of breath. Oxygen weaned from 15L nonrebreather to 3L NC. Coreg home med restarted which helped BP and HR. HR was 120s start of shift now low 100s. Nitro gtt able to be weaned from 60 to 10. Ct surgery consulted who wanted patient sent to [facility]. [facility] accepted. Patient currently awaiting transport.    Vitals:    Vitals:    05/23/25 1823 05/23/25 1830 05/23/25 1845 05/23/25 1900   BP: (!) 128/92 (!) 150/77 (!) 157/83 (!) 142/70   Pulse: (!) 115 (!) 112 (!) 107 (!) 110   Resp:  22 20 22   Temp:       TempSrc:       SpO2:  97% 97% 98%   Weight:       Height:            I&O:   Intake/Output Summary (Last 24 hours) at 5/23/2025 1920  Last data filed at 5/23/2025 1842  Gross per 24 hour   Intake 199.6 ml   Output 1110 ml   Net -910.4 ml       Resp Status: 3L NC    Ventilator Settings:     / / /FiO2 : 70 %    Critical Care IV infusions:   cangrelor (KENGREAL) 50 mg in sodium chloride 0.9 % 250 mL infusion 4 mcg/kg/min (05/23/25 1842)    sodium chloride      sodium chloride      nitroGLYCERIN 50 mcg/min (05/23/25 0909)        LDA:   Peripheral IV 05/22/25 Right Antecubital (Active)   Number of days: 0       Peripheral IV 05/23/25 Distal;Right Forearm (Active)   Number of days: 0       External Urinary Catheter (Active)   Number of days: 0       Incision 12/03/20 Abdomen Lower;Medial (Active)   Number of days: 1632

## 2025-05-23 NOTE — ED NOTES
Pt arrived to ED with c/o chest pain and arm pain. Pt states he was told he had a pinched nerve that started on April 12th. Pt has been seen twice since then for the same thing. Pt states the pain is all over chest. Pt states pain started tonight at 2030. Pt states he was just sitting watching tv when pain started tonight. Pt is moaning in pain. Pt states pain has never been this bad.  Pt is A&Ox4. Pts significant other and son at bedside.

## 2025-05-23 NOTE — CARE COORDINATION
Case Management Assessment  Initial Evaluation    Date/Time of Evaluation: 5/23/2025 12:29 PM  Assessment Completed by: KALEIGH JOYCE RN    If patient is discharged prior to next notation, then this note serves as note for discharge by case management.    Patient Name: Joel Guzmán                   YOB: 1945  Diagnosis: STEMI (ST elevation myocardial infarction) (HCC) [I21.3]  ST elevation myocardial infarction (STEMI), unspecified artery (HCC) [I21.3]                   Date / Time: 5/22/2025 10:57 PM    Patient Admission Status: Inpatient   Readmission Risk (Low < 19, Mod (19-27), High > 27): Readmission Risk Score: 12.4    Current PCP: No primary care provider on file.  PCP verified by CM? Yes    Chart Reviewed: Yes      History Provided by: Patient  Patient Orientation: Alert and Oriented    Patient Cognition: Alert    Hospitalization in the last 30 days (Readmission):  No    If yes, Readmission Assessment in CM Navigator will be completed.    Advance Directives:      Code Status: Full Code   Patient's Primary Decision Maker is: Legal Next of Kin (spouse)      Discharge Planning:    Patient lives with: Spouse/Significant Other Type of Home: House  Primary Care Giver: Self  Patient Support Systems include: Spouse/Significant Other   Current Financial resources: Medicare  Current community resources: None  Current services prior to admission: Durable Medical Equipment            Current DME: Walker, Wheelchair, Cane (Has equipment, but does not use at thist ana.)            Type of Home Care services:  None    ADLS  Prior functional level: Independent in ADLs/IADLs  Current functional level: Assistance with the following:, Toileting, Cooking, Shopping, Housework, Mobility    PT AM-PAC:   /24  OT AM-PAC:   /24    Family can provide assistance at DC: Yes  Would you like Case Management to discuss the discharge plan with any other family members/significant others, and if so, who? Yes (spouse or

## 2025-05-23 NOTE — H&P
[facility]    H/P                       Today's Date: 5/23/2025  Patient Name: Joel Guzmán  Date of admission: 5/22/2025 10:57 PM  Patient's age: 80 y.o., 1945  Admission Dx: No admission diagnoses are documented for this encounter.    CHIEF COMPLAINT: Chest pain and left arm pain    History Obtained From:  patient, electronic medical record    HISTORY OF PRESENT ILLNESS:      The patient is a 80 y.o.  male who presented to emergency room with chest pain and left arm pain.  He reports that he was told that he has a pinched nerve.  He was evaluated on April 12 and was admitted.  Stress test showed low risk findings.Echocardiogram showed preserved LV systolic function and mild aortic regurgitation.  He was discharged.  Subsequently he presented again on April 19 to emergency room for chest pain and he was discharged.  Today he presented back with worsening chest pain.  STEMI diagnosed on ECG 5/23/2025 at 1:19 AM.  Aspirin and IV heparin given.    Past Medical History:   has a past medical history of Allergic rhinitis, Coronary artery disease, GERD without esophagitis, Glaucoma, and Hypertension.    Past Surgical History:   has a past surgical history that includes Cardiac catheterization (2006); Vasectomy; Colonoscopy; Tonsillectomy; Cholecystectomy, laparoscopic (N/A, 12/3/2020); and Eye surgery (05/2022).     Home Medications:    Prior to Admission medications    Medication Sig Start Date End Date Taking? Authorizing Provider   ibuprofen (IBU) 600 MG tablet Take 1 tablet by mouth every 6 hours as needed for Pain 4/19/25   Goldberger, Erica B, MD   carvedilol (COREG) 12.5 MG tablet Take 1 tablet by mouth 2 times daily (with meals) 4/14/25   Wesley Wang MD   dorzolamide-timolol (COSOPT) 22.3-6.8 MG/ML ophthalmic solution Place 1 drop into both eyes 2 times daily    Provider, MD Mariajose   vitamin D (CHOLECALCIFEROL) 25 MCG (1000 UT) TABS tablet Take 1 tablet by mouth

## 2025-05-24 LAB
ANION GAP SERPL CALCULATED.3IONS-SCNC: 11 MMOL/L (ref 9–16)
BASOPHILS # BLD: <0.03 K/UL (ref 0–0.2)
BASOPHILS NFR BLD: 0 % (ref 0–2)
BUN SERPL-MCNC: 18 MG/DL (ref 8–23)
CALCIUM SERPL-MCNC: 8.9 MG/DL (ref 8.6–10.4)
CHLORIDE SERPL-SCNC: 110 MMOL/L (ref 98–107)
CHOLEST SERPL-MCNC: 140 MG/DL (ref 0–199)
CHOLESTEROL/HDL RATIO: 3.1
CO2 SERPL-SCNC: 20 MMOL/L (ref 20–31)
CREAT SERPL-MCNC: 0.9 MG/DL (ref 0.7–1.2)
EOSINOPHIL # BLD: <0.03 K/UL (ref 0–0.44)
EOSINOPHILS RELATIVE PERCENT: 0 % (ref 1–4)
ERYTHROCYTE [DISTWIDTH] IN BLOOD BY AUTOMATED COUNT: 13.5 % (ref 11.8–14.4)
GFR, ESTIMATED: 87 ML/MIN/1.73M2
GLUCOSE SERPL-MCNC: 128 MG/DL (ref 74–99)
HCT VFR BLD AUTO: 35.4 % (ref 40.7–50.3)
HDLC SERPL-MCNC: 45 MG/DL
HGB BLD-MCNC: 12.1 G/DL (ref 13–17)
IMM GRANULOCYTES # BLD AUTO: 0.1 K/UL (ref 0–0.3)
IMM GRANULOCYTES NFR BLD: 1 %
LDLC SERPL CALC-MCNC: 80 MG/DL (ref 0–100)
LYMPHOCYTES NFR BLD: 1.01 K/UL (ref 1.1–3.7)
LYMPHOCYTES RELATIVE PERCENT: 6 % (ref 24–43)
MAGNESIUM SERPL-MCNC: 1.9 MG/DL (ref 1.6–2.4)
MCH RBC QN AUTO: 30 PG (ref 25.2–33.5)
MCHC RBC AUTO-ENTMCNC: 34.2 G/DL (ref 28.4–34.8)
MCV RBC AUTO: 87.6 FL (ref 82.6–102.9)
MONOCYTES NFR BLD: 1.01 K/UL (ref 0.1–1.2)
MONOCYTES NFR BLD: 6 % (ref 3–12)
NEUTROPHILS NFR BLD: 87 % (ref 36–65)
NEUTS SEG NFR BLD: 14.3 K/UL (ref 1.5–8.1)
NRBC BLD-RTO: 0 PER 100 WBC
PLATELET # BLD AUTO: 216 K/UL (ref 138–453)
PMV BLD AUTO: 9.8 FL (ref 8.1–13.5)
POTASSIUM SERPL-SCNC: 3.9 MMOL/L (ref 3.7–5.3)
RBC # BLD AUTO: 4.04 M/UL (ref 4.21–5.77)
SODIUM SERPL-SCNC: 141 MMOL/L (ref 136–145)
TRIGL SERPL-MCNC: 77 MG/DL
VLDLC SERPL CALC-MCNC: 15 MG/DL (ref 1–30)
WBC OTHER # BLD: 16.4 K/UL (ref 3.5–11.3)

## 2025-05-24 PROCEDURE — 2580000003 HC RX 258: Performed by: INTERNAL MEDICINE

## 2025-05-24 PROCEDURE — 2060000000 HC ICU INTERMEDIATE R&B

## 2025-05-24 PROCEDURE — 6360000002 HC RX W HCPCS

## 2025-05-24 PROCEDURE — 80061 LIPID PANEL: CPT

## 2025-05-24 PROCEDURE — 2500000003 HC RX 250 WO HCPCS: Performed by: NURSE PRACTITIONER

## 2025-05-24 PROCEDURE — 6370000000 HC RX 637 (ALT 250 FOR IP): Performed by: NURSE PRACTITIONER

## 2025-05-24 PROCEDURE — 83735 ASSAY OF MAGNESIUM: CPT

## 2025-05-24 PROCEDURE — 36415 COLL VENOUS BLD VENIPUNCTURE: CPT

## 2025-05-24 PROCEDURE — 99232 SBSQ HOSP IP/OBS MODERATE 35: CPT | Performed by: FAMILY MEDICINE

## 2025-05-24 PROCEDURE — 6360000002 HC RX W HCPCS: Performed by: NURSE PRACTITIONER

## 2025-05-24 PROCEDURE — 85025 COMPLETE CBC W/AUTO DIFF WBC: CPT

## 2025-05-24 PROCEDURE — 80048 BASIC METABOLIC PNL TOTAL CA: CPT

## 2025-05-24 PROCEDURE — 99232 SBSQ HOSP IP/OBS MODERATE 35: CPT | Performed by: INTERNAL MEDICINE

## 2025-05-24 PROCEDURE — 6360000002 HC RX W HCPCS: Performed by: INTERNAL MEDICINE

## 2025-05-24 PROCEDURE — 6370000000 HC RX 637 (ALT 250 FOR IP): Performed by: INTERNAL MEDICINE

## 2025-05-24 PROCEDURE — C9460 INJECTION, CANGRELOR: HCPCS | Performed by: INTERNAL MEDICINE

## 2025-05-24 RX ORDER — TICAGRELOR 90 MG/1
90 TABLET, FILM COATED ORAL 2 TIMES DAILY
Status: DISCONTINUED | OUTPATIENT
Start: 2025-05-25 | End: 2025-05-27 | Stop reason: HOSPADM

## 2025-05-24 RX ORDER — TICAGRELOR 90 MG/1
180 TABLET, FILM COATED ORAL ONCE
Status: COMPLETED | OUTPATIENT
Start: 2025-05-24 | End: 2025-05-24

## 2025-05-24 RX ADMIN — CANGRELOR 4 MCG/KG/MIN: 50 INJECTION, POWDER, LYOPHILIZED, FOR SOLUTION INTRAVENOUS at 07:11

## 2025-05-24 RX ADMIN — DORZOLAMIDE HYDROCHLORIDE AND TIMOLOL MALEATE 1 DROP: 20; 5 SOLUTION/ DROPS OPHTHALMIC at 10:13

## 2025-05-24 RX ADMIN — CARVEDILOL 25 MG: 25 TABLET, FILM COATED ORAL at 17:56

## 2025-05-24 RX ADMIN — CANGRELOR 4 MCG/KG/MIN: 50 INJECTION, POWDER, LYOPHILIZED, FOR SOLUTION INTRAVENOUS at 04:26

## 2025-05-24 RX ADMIN — LATANOPROST 1 DROP: 50 SOLUTION OPHTHALMIC at 20:42

## 2025-05-24 RX ADMIN — CANGRELOR 4 MCG/KG/MIN: 50 INJECTION, POWDER, LYOPHILIZED, FOR SOLUTION INTRAVENOUS at 09:53

## 2025-05-24 RX ADMIN — ONDANSETRON 4 MG: 2 INJECTION, SOLUTION INTRAMUSCULAR; INTRAVENOUS at 12:31

## 2025-05-24 RX ADMIN — DORZOLAMIDE HYDROCHLORIDE AND TIMOLOL MALEATE 1 DROP: 20; 5 SOLUTION/ DROPS OPHTHALMIC at 18:13

## 2025-05-24 RX ADMIN — LOSARTAN POTASSIUM 25 MG: 25 TABLET, FILM COATED ORAL at 08:10

## 2025-05-24 RX ADMIN — METOPROLOL TARTRATE 5 MG: 5 INJECTION INTRAVENOUS at 02:03

## 2025-05-24 RX ADMIN — ATORVASTATIN CALCIUM 80 MG: 80 TABLET, FILM COATED ORAL at 20:41

## 2025-05-24 RX ADMIN — SODIUM CHLORIDE, PRESERVATIVE FREE 5 ML: 5 INJECTION INTRAVENOUS at 20:42

## 2025-05-24 RX ADMIN — CANGRELOR 4 MCG/KG/MIN: 50 INJECTION, POWDER, LYOPHILIZED, FOR SOLUTION INTRAVENOUS at 01:30

## 2025-05-24 RX ADMIN — TICAGRELOR 180 MG: 90 TABLET ORAL at 12:38

## 2025-05-24 RX ADMIN — NITROGLYCERIN 20 MCG/MIN: 20 INJECTION INTRAVENOUS at 00:48

## 2025-05-24 RX ADMIN — ASPIRIN 81 MG: 81 TABLET, COATED ORAL at 08:10

## 2025-05-24 RX ADMIN — CARVEDILOL 25 MG: 25 TABLET, FILM COATED ORAL at 08:10

## 2025-05-24 RX ADMIN — Medication 1000 UNITS: at 08:10

## 2025-05-24 ASSESSMENT — PAIN SCALES - GENERAL: PAINLEVEL_OUTOF10: 0

## 2025-05-24 NOTE — CARE COORDINATION
Case Management Assessment  Initial Evaluation    Date/Time of Evaluation: 5/24/2025 3:43 PM  Assessment Completed by: Rossi Lieberman RN    If patient is discharged prior to next notation, then this note serves as note for discharge by case management.    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  PCP verified by CM? (P) Yes    Chart Reviewed: Yes      History Provided by: Patient  Patient Orientation: Alert and Oriented    Patient Cognition: Alert    Hospitalization in the last 30 days (Readmission):  Yes    If yes, Readmission Assessment in CM Navigator will be completed.    Advance Directives:      Code Status: Full Code   Patient's Primary Decision Maker is: Legal Next of Kin      Discharge Planning:    Patient lives with: (P) Spouse/Significant Other Type of Home: (P) House  Primary Care Giver: Self  Patient Support Systems include: Spouse/Significant Other   Current Financial resources: (P) Medicare  Current community resources:    Current services prior to admission: (P) Durable Medical Equipment            Current DME: (P) Other (Comment) (has DME available but does not use)            Type of Home Care services:  (P) None    ADLS  Prior functional level: (P) Independent in ADLs/IADLs  Current functional level: (P) Independent in ADLs/IADLs    PT AM-PAC:   /24  OT AM-PAC:   /24    Family can provide assistance at DC: (P) Yes  Would you like Case Management to discuss the discharge plan with any other family members/significant others, and if so, who? (P) Yes (wife or son)  Plans to Return to Present Housing: (P) Unknown at present  Other Identified Issues/Barriers to RETURNING to current housing: none  Potential

## 2025-05-25 PROCEDURE — 93005 ELECTROCARDIOGRAM TRACING: CPT | Performed by: FAMILY MEDICINE

## 2025-05-25 PROCEDURE — 2060000000 HC ICU INTERMEDIATE R&B

## 2025-05-25 PROCEDURE — 6360000002 HC RX W HCPCS: Performed by: NURSE PRACTITIONER

## 2025-05-25 PROCEDURE — 6370000000 HC RX 637 (ALT 250 FOR IP): Performed by: INTERNAL MEDICINE

## 2025-05-25 PROCEDURE — 99232 SBSQ HOSP IP/OBS MODERATE 35: CPT | Performed by: INTERNAL MEDICINE

## 2025-05-25 PROCEDURE — 99232 SBSQ HOSP IP/OBS MODERATE 35: CPT | Performed by: FAMILY MEDICINE

## 2025-05-25 PROCEDURE — 2500000003 HC RX 250 WO HCPCS: Performed by: NURSE PRACTITIONER

## 2025-05-25 PROCEDURE — 6370000000 HC RX 637 (ALT 250 FOR IP): Performed by: NURSE PRACTITIONER

## 2025-05-25 RX ORDER — CARVEDILOL 12.5 MG/1
12.5 TABLET ORAL 2 TIMES DAILY WITH MEALS
Status: DISCONTINUED | OUTPATIENT
Start: 2025-05-25 | End: 2025-05-27 | Stop reason: HOSPADM

## 2025-05-25 RX ADMIN — Medication 1000 UNITS: at 09:01

## 2025-05-25 RX ADMIN — CARVEDILOL 12.5 MG: 12.5 TABLET, FILM COATED ORAL at 17:17

## 2025-05-25 RX ADMIN — ONDANSETRON 4 MG: 2 INJECTION, SOLUTION INTRAMUSCULAR; INTRAVENOUS at 16:11

## 2025-05-25 RX ADMIN — METOPROLOL TARTRATE 5 MG: 5 INJECTION INTRAVENOUS at 00:23

## 2025-05-25 RX ADMIN — ATORVASTATIN CALCIUM 80 MG: 80 TABLET, FILM COATED ORAL at 20:50

## 2025-05-25 RX ADMIN — TICAGRELOR 90 MG: 90 TABLET ORAL at 09:01

## 2025-05-25 RX ADMIN — SODIUM CHLORIDE, PRESERVATIVE FREE 10 ML: 5 INJECTION INTRAVENOUS at 09:02

## 2025-05-25 RX ADMIN — DORZOLAMIDE HYDROCHLORIDE AND TIMOLOL MALEATE 1 DROP: 20; 5 SOLUTION/ DROPS OPHTHALMIC at 09:02

## 2025-05-25 RX ADMIN — LOSARTAN POTASSIUM 25 MG: 25 TABLET, FILM COATED ORAL at 09:01

## 2025-05-25 RX ADMIN — SODIUM CHLORIDE, PRESERVATIVE FREE 10 ML: 5 INJECTION INTRAVENOUS at 20:52

## 2025-05-25 RX ADMIN — CARVEDILOL 25 MG: 25 TABLET, FILM COATED ORAL at 09:01

## 2025-05-25 RX ADMIN — TICAGRELOR 90 MG: 90 TABLET ORAL at 20:50

## 2025-05-25 RX ADMIN — ASPIRIN 81 MG: 81 TABLET, COATED ORAL at 09:01

## 2025-05-25 RX ADMIN — DORZOLAMIDE HYDROCHLORIDE AND TIMOLOL MALEATE 1 DROP: 20; 5 SOLUTION/ DROPS OPHTHALMIC at 17:18

## 2025-05-25 RX ADMIN — LATANOPROST 1 DROP: 50 SOLUTION OPHTHALMIC at 20:50

## 2025-05-25 ASSESSMENT — PAIN SCALES - GENERAL: PAINLEVEL_OUTOF10: 0

## 2025-05-25 NOTE — PLAN OF CARE
Problem: Discharge Planning  Goal: Discharge to home or other facility with appropriate resources  5/25/2025 0506 by Rasheeda Chakraborty RN  Outcome: Progressing  5/24/2025 1801 by Keily Norman RN  Outcome: Progressing     Problem: Safety - Adult  Goal: Free from fall injury  5/25/2025 0506 by Rasheeda Chakraborty RN  Outcome: Progressing  5/24/2025 1801 by Keily Norman RN  Outcome: Progressing     Problem: Pain  Goal: Verbalizes/displays adequate comfort level or baseline comfort level  5/25/2025 0506 by Rasheeda Chakraborty RN  Outcome: Progressing  5/24/2025 1801 by Keily Norman RN  Outcome: Progressing

## 2025-05-25 NOTE — PLAN OF CARE
Problem: Discharge Planning  Goal: Discharge to home or other facility with appropriate resources  5/25/2025 1142 by Kristine Singh RN  Outcome: Progressing  5/25/2025 0506 by Rasheeda Chakraborty RN  Outcome: Progressing     Problem: Safety - Adult  Goal: Free from fall injury  5/25/2025 1142 by Kristine Singh RN  Outcome: Progressing  5/25/2025 0506 by Rasheeda Chakraborty RN  Outcome: Progressing     Problem: Pain  Goal: Verbalizes/displays adequate comfort level or baseline comfort level  5/25/2025 1142 by Kristine Singh RN  Outcome: Progressing  5/25/2025 0506 by Rasheeda Chakraborty RN  Outcome: Progressing

## 2025-05-26 LAB
ANION GAP SERPL CALCULATED.3IONS-SCNC: 10 MMOL/L (ref 9–16)
BUN SERPL-MCNC: 21 MG/DL (ref 8–23)
CALCIUM SERPL-MCNC: 8.9 MG/DL (ref 8.6–10.4)
CHLORIDE SERPL-SCNC: 110 MMOL/L (ref 98–107)
CO2 SERPL-SCNC: 22 MMOL/L (ref 20–31)
CREAT SERPL-MCNC: 0.8 MG/DL (ref 0.7–1.2)
ERYTHROCYTE [DISTWIDTH] IN BLOOD BY AUTOMATED COUNT: 13.5 % (ref 11.8–14.4)
GFR, ESTIMATED: >90 ML/MIN/1.73M2
GLUCOSE SERPL-MCNC: 100 MG/DL (ref 74–99)
HCT VFR BLD AUTO: 33.9 % (ref 40.7–50.3)
HGB BLD-MCNC: 11.5 G/DL (ref 13–17)
MAGNESIUM SERPL-MCNC: 2 MG/DL (ref 1.6–2.4)
MCH RBC QN AUTO: 30.5 PG (ref 25.2–33.5)
MCHC RBC AUTO-ENTMCNC: 33.9 G/DL (ref 28.4–34.8)
MCV RBC AUTO: 89.9 FL (ref 82.6–102.9)
NRBC BLD-RTO: 0 PER 100 WBC
PLATELET # BLD AUTO: 181 K/UL (ref 138–453)
PMV BLD AUTO: 10.2 FL (ref 8.1–13.5)
POTASSIUM SERPL-SCNC: 3.6 MMOL/L (ref 3.7–5.3)
RBC # BLD AUTO: 3.77 M/UL (ref 4.21–5.77)
SODIUM SERPL-SCNC: 142 MMOL/L (ref 136–145)
WBC OTHER # BLD: 9.7 K/UL (ref 3.5–11.3)

## 2025-05-26 PROCEDURE — 6370000000 HC RX 637 (ALT 250 FOR IP): Performed by: INTERNAL MEDICINE

## 2025-05-26 PROCEDURE — 2500000003 HC RX 250 WO HCPCS: Performed by: NURSE PRACTITIONER

## 2025-05-26 PROCEDURE — 83735 ASSAY OF MAGNESIUM: CPT

## 2025-05-26 PROCEDURE — 80048 BASIC METABOLIC PNL TOTAL CA: CPT

## 2025-05-26 PROCEDURE — 99232 SBSQ HOSP IP/OBS MODERATE 35: CPT | Performed by: FAMILY MEDICINE

## 2025-05-26 PROCEDURE — 85027 COMPLETE CBC AUTOMATED: CPT

## 2025-05-26 PROCEDURE — 99232 SBSQ HOSP IP/OBS MODERATE 35: CPT | Performed by: INTERNAL MEDICINE

## 2025-05-26 PROCEDURE — 36415 COLL VENOUS BLD VENIPUNCTURE: CPT

## 2025-05-26 PROCEDURE — 2060000000 HC ICU INTERMEDIATE R&B

## 2025-05-26 PROCEDURE — 6370000000 HC RX 637 (ALT 250 FOR IP): Performed by: NURSE PRACTITIONER

## 2025-05-26 RX ORDER — POTASSIUM CHLORIDE 1500 MG/1
40 TABLET, EXTENDED RELEASE ORAL ONCE
Status: DISCONTINUED | OUTPATIENT
Start: 2025-05-26 | End: 2025-05-27 | Stop reason: HOSPADM

## 2025-05-26 RX ADMIN — LATANOPROST 1 DROP: 50 SOLUTION OPHTHALMIC at 20:15

## 2025-05-26 RX ADMIN — LOSARTAN POTASSIUM 25 MG: 25 TABLET, FILM COATED ORAL at 08:09

## 2025-05-26 RX ADMIN — Medication 1000 UNITS: at 08:09

## 2025-05-26 RX ADMIN — TICAGRELOR 90 MG: 90 TABLET ORAL at 10:40

## 2025-05-26 RX ADMIN — SODIUM CHLORIDE, PRESERVATIVE FREE 10 ML: 5 INJECTION INTRAVENOUS at 08:09

## 2025-05-26 RX ADMIN — ASPIRIN 81 MG: 81 TABLET, COATED ORAL at 08:09

## 2025-05-26 RX ADMIN — CARVEDILOL 12.5 MG: 12.5 TABLET, FILM COATED ORAL at 17:20

## 2025-05-26 RX ADMIN — POTASSIUM CHLORIDE 40 MEQ: 1500 TABLET, EXTENDED RELEASE ORAL at 08:09

## 2025-05-26 RX ADMIN — ATORVASTATIN CALCIUM 80 MG: 80 TABLET, FILM COATED ORAL at 20:14

## 2025-05-26 RX ADMIN — DORZOLAMIDE HYDROCHLORIDE AND TIMOLOL MALEATE 1 DROP: 20; 5 SOLUTION/ DROPS OPHTHALMIC at 08:06

## 2025-05-26 RX ADMIN — CARVEDILOL 12.5 MG: 12.5 TABLET, FILM COATED ORAL at 08:08

## 2025-05-26 RX ADMIN — DORZOLAMIDE HYDROCHLORIDE AND TIMOLOL MALEATE 1 DROP: 20; 5 SOLUTION/ DROPS OPHTHALMIC at 17:21

## 2025-05-26 RX ADMIN — TICAGRELOR 90 MG: 90 TABLET ORAL at 20:14

## 2025-05-26 RX ADMIN — SODIUM CHLORIDE, PRESERVATIVE FREE 10 ML: 5 INJECTION INTRAVENOUS at 20:14

## 2025-05-26 ASSESSMENT — PAIN SCALES - GENERAL: PAINLEVEL_OUTOF10: 0

## 2025-05-26 NOTE — PLAN OF CARE
Problem: Discharge Planning  Goal: Discharge to home or other facility with appropriate resources  5/26/2025 1946 by Mechelle Renteria RN  Outcome: Progressing  5/26/2025 1206 by Kristine Singh RN  Outcome: Progressing     Problem: Safety - Adult  Goal: Free from fall injury  5/26/2025 1946 by Mechelle Renteria RN  Outcome: Progressing  5/26/2025 1206 by Kristine Singh RN  Outcome: Progressing     Problem: Pain  Goal: Verbalizes/displays adequate comfort level or baseline comfort level  5/26/2025 1946 by Mechelle Renteria RN  Outcome: Progressing  5/26/2025 1206 by Kristine Singh RN  Outcome: Progressing     Problem: Chronic Conditions and Co-morbidities  Goal: Patient's chronic conditions and co-morbidity symptoms are monitored and maintained or improved  Outcome: Progressing

## 2025-05-27 VITALS
SYSTOLIC BLOOD PRESSURE: 135 MMHG | DIASTOLIC BLOOD PRESSURE: 55 MMHG | HEART RATE: 73 BPM | OXYGEN SATURATION: 97 % | RESPIRATION RATE: 16 BRPM | WEIGHT: 189 LBS | BODY MASS INDEX: 27.06 KG/M2 | HEIGHT: 70 IN | TEMPERATURE: 98.2 F

## 2025-05-27 PROBLEM — I24.9 ACUTE CORONARY SYNDROME (HCC): Status: ACTIVE | Noted: 2025-05-23

## 2025-05-27 LAB
ACT BLD: 267 SEC (ref 79–149)
ANION GAP SERPL CALCULATED.3IONS-SCNC: 11 MMOL/L (ref 9–16)
BUN SERPL-MCNC: 22 MG/DL (ref 8–23)
CALCIUM SERPL-MCNC: 8.9 MG/DL (ref 8.6–10.4)
CHLORIDE SERPL-SCNC: 111 MMOL/L (ref 98–107)
CO2 SERPL-SCNC: 19 MMOL/L (ref 20–31)
CREAT SERPL-MCNC: 0.9 MG/DL (ref 0.7–1.2)
ECHO BSA: 2.06 M2
EKG ATRIAL RATE: 104 BPM
EKG P AXIS: 68 DEGREES
EKG P-R INTERVAL: 164 MS
EKG Q-T INTERVAL: 362 MS
EKG QRS DURATION: 84 MS
EKG QTC CALCULATION (BAZETT): 476 MS
EKG R AXIS: 54 DEGREES
EKG T AXIS: -42 DEGREES
EKG VENTRICULAR RATE: 104 BPM
ERYTHROCYTE [DISTWIDTH] IN BLOOD BY AUTOMATED COUNT: 13.5 % (ref 11.8–14.4)
GFR, ESTIMATED: 87 ML/MIN/1.73M2
GLUCOSE SERPL-MCNC: 98 MG/DL (ref 74–99)
HCT VFR BLD AUTO: 36.5 % (ref 40.7–50.3)
HGB BLD-MCNC: 12.2 G/DL (ref 13–17)
MAGNESIUM SERPL-MCNC: 2.2 MG/DL (ref 1.6–2.4)
MCH RBC QN AUTO: 30.2 PG (ref 25.2–33.5)
MCHC RBC AUTO-ENTMCNC: 33.4 G/DL (ref 28.4–34.8)
MCV RBC AUTO: 90.3 FL (ref 82.6–102.9)
NRBC BLD-RTO: 0 PER 100 WBC
PLATELET # BLD AUTO: 222 K/UL (ref 138–453)
PMV BLD AUTO: 10.3 FL (ref 8.1–13.5)
POTASSIUM SERPL-SCNC: 3.4 MMOL/L (ref 3.7–5.3)
RBC # BLD AUTO: 4.04 M/UL (ref 4.21–5.77)
SODIUM SERPL-SCNC: 141 MMOL/L (ref 136–145)
WBC OTHER # BLD: 8.4 K/UL (ref 3.5–11.3)

## 2025-05-27 PROCEDURE — 6370000000 HC RX 637 (ALT 250 FOR IP): Performed by: INTERNAL MEDICINE

## 2025-05-27 PROCEDURE — 99239 HOSP IP/OBS DSCHRG MGMT >30: CPT | Performed by: STUDENT IN AN ORGANIZED HEALTH CARE EDUCATION/TRAINING PROGRAM

## 2025-05-27 PROCEDURE — 2500000003 HC RX 250 WO HCPCS: Performed by: INTERNAL MEDICINE

## 2025-05-27 PROCEDURE — C1769 GUIDE WIRE: HCPCS | Performed by: INTERNAL MEDICINE

## 2025-05-27 PROCEDURE — C1725 CATH, TRANSLUMIN NON-LASER: HCPCS | Performed by: INTERNAL MEDICINE

## 2025-05-27 PROCEDURE — 2709999900 HC NON-CHARGEABLE SUPPLY: Performed by: INTERNAL MEDICINE

## 2025-05-27 PROCEDURE — C1874 STENT, COATED/COV W/DEL SYS: HCPCS | Performed by: INTERNAL MEDICINE

## 2025-05-27 PROCEDURE — 80048 BASIC METABOLIC PNL TOTAL CA: CPT

## 2025-05-27 PROCEDURE — C9600 PERC DRUG-EL COR STENT SING: HCPCS | Performed by: INTERNAL MEDICINE

## 2025-05-27 PROCEDURE — 92929 PR PRQ TRLUML CORONARY STENT W/ANGIO ADDL ART/BRNCH: CPT | Performed by: INTERNAL MEDICINE

## 2025-05-27 PROCEDURE — C1887 CATHETER, GUIDING: HCPCS | Performed by: INTERNAL MEDICINE

## 2025-05-27 PROCEDURE — B240ZZ3 ULTRASONOGRAPHY OF SINGLE CORONARY ARTERY, INTRAVASCULAR: ICD-10-PCS | Performed by: INTERNAL MEDICINE

## 2025-05-27 PROCEDURE — 83735 ASSAY OF MAGNESIUM: CPT

## 2025-05-27 PROCEDURE — 36415 COLL VENOUS BLD VENIPUNCTURE: CPT

## 2025-05-27 PROCEDURE — B2111ZZ FLUOROSCOPY OF MULTIPLE CORONARY ARTERIES USING LOW OSMOLAR CONTRAST: ICD-10-PCS | Performed by: INTERNAL MEDICINE

## 2025-05-27 PROCEDURE — 2580000003 HC RX 258: Performed by: INTERNAL MEDICINE

## 2025-05-27 PROCEDURE — 92928 PRQ TCAT PLMT NTRAC ST 1 LES: CPT | Performed by: INTERNAL MEDICINE

## 2025-05-27 PROCEDURE — 027136Z DILATION OF CORONARY ARTERY, TWO ARTERIES WITH THREE DRUG-ELUTING INTRALUMINAL DEVICES, PERCUTANEOUS APPROACH: ICD-10-PCS | Performed by: INTERNAL MEDICINE

## 2025-05-27 PROCEDURE — 93010 ELECTROCARDIOGRAM REPORT: CPT | Performed by: INTERNAL MEDICINE

## 2025-05-27 PROCEDURE — 6370000000 HC RX 637 (ALT 250 FOR IP): Performed by: NURSE PRACTITIONER

## 2025-05-27 PROCEDURE — C1894 INTRO/SHEATH, NON-LASER: HCPCS | Performed by: INTERNAL MEDICINE

## 2025-05-27 PROCEDURE — 99153 MOD SED SAME PHYS/QHP EA: CPT | Performed by: INTERNAL MEDICINE

## 2025-05-27 PROCEDURE — 85027 COMPLETE CBC AUTOMATED: CPT

## 2025-05-27 PROCEDURE — C1753 CATH, INTRAVAS ULTRASOUND: HCPCS | Performed by: INTERNAL MEDICINE

## 2025-05-27 PROCEDURE — 92920 PRQ TRLUML C ANGIOP 1ART&/BR: CPT | Performed by: INTERNAL MEDICINE

## 2025-05-27 PROCEDURE — 99152 MOD SED SAME PHYS/QHP 5/>YRS: CPT | Performed by: INTERNAL MEDICINE

## 2025-05-27 PROCEDURE — 6360000002 HC RX W HCPCS: Performed by: INTERNAL MEDICINE

## 2025-05-27 PROCEDURE — 6360000004 HC RX CONTRAST MEDICATION: Performed by: INTERNAL MEDICINE

## 2025-05-27 PROCEDURE — 85347 COAGULATION TIME ACTIVATED: CPT

## 2025-05-27 PROCEDURE — 2500000003 HC RX 250 WO HCPCS: Performed by: NURSE PRACTITIONER

## 2025-05-27 DEVICE — STENT ONYXNG27512UX ONYX 2.75X12RX
Type: IMPLANTABLE DEVICE | Status: FUNCTIONAL
Brand: ONYX FRONTIER™

## 2025-05-27 DEVICE — STENT ONYXNG27526UX ONYX 2.75X26RX
Type: IMPLANTABLE DEVICE | Status: FUNCTIONAL
Brand: ONYX FRONTIER™

## 2025-05-27 DEVICE — STENT ONYXNG30012UX ONYX 3.00X12RX
Type: IMPLANTABLE DEVICE | Status: FUNCTIONAL
Brand: ONYX FRONTIER™

## 2025-05-27 RX ORDER — SODIUM CHLORIDE 9 MG/ML
INJECTION, SOLUTION INTRAVENOUS PRN
Status: DISCONTINUED | OUTPATIENT
Start: 2025-05-27 | End: 2025-05-27 | Stop reason: HOSPADM

## 2025-05-27 RX ORDER — SODIUM CHLORIDE 9 MG/ML
INJECTION, SOLUTION INTRAVENOUS CONTINUOUS
Status: ACTIVE | OUTPATIENT
Start: 2025-05-27 | End: 2025-05-27

## 2025-05-27 RX ORDER — HEPARIN SODIUM 1000 [USP'U]/ML
INJECTION, SOLUTION INTRAVENOUS; SUBCUTANEOUS PRN
Status: DISCONTINUED | OUTPATIENT
Start: 2025-05-27 | End: 2025-05-27 | Stop reason: HOSPADM

## 2025-05-27 RX ORDER — ATORVASTATIN CALCIUM 80 MG/1
80 TABLET, FILM COATED ORAL NIGHTLY
Qty: 30 TABLET | Refills: 3 | Status: SHIPPED | OUTPATIENT
Start: 2025-05-27

## 2025-05-27 RX ORDER — TICAGRELOR 90 MG/1
90 TABLET, FILM COATED ORAL 2 TIMES DAILY
Qty: 60 TABLET | Refills: 1 | Status: SHIPPED | OUTPATIENT
Start: 2025-05-27

## 2025-05-27 RX ORDER — SODIUM CHLORIDE 0.9 % (FLUSH) 0.9 %
5-40 SYRINGE (ML) INJECTION PRN
Status: DISCONTINUED | OUTPATIENT
Start: 2025-05-27 | End: 2025-05-27 | Stop reason: HOSPADM

## 2025-05-27 RX ORDER — ASPIRIN 325 MG
TABLET ORAL PRN
Status: DISCONTINUED | OUTPATIENT
Start: 2025-05-27 | End: 2025-05-27 | Stop reason: HOSPADM

## 2025-05-27 RX ORDER — TICAGRELOR 90 MG/1
TABLET, FILM COATED ORAL PRN
Status: DISCONTINUED | OUTPATIENT
Start: 2025-05-27 | End: 2025-05-27 | Stop reason: HOSPADM

## 2025-05-27 RX ORDER — NITROGLYCERIN 20 MG/100ML
INJECTION INTRAVENOUS PRN
Status: DISCONTINUED | OUTPATIENT
Start: 2025-05-27 | End: 2025-05-27 | Stop reason: HOSPADM

## 2025-05-27 RX ORDER — ACETAMINOPHEN 325 MG/1
650 TABLET ORAL EVERY 4 HOURS PRN
Status: DISCONTINUED | OUTPATIENT
Start: 2025-05-27 | End: 2025-05-27 | Stop reason: HOSPADM

## 2025-05-27 RX ORDER — LOSARTAN POTASSIUM 25 MG/1
25 TABLET ORAL DAILY
Qty: 30 TABLET | Refills: 3 | Status: SHIPPED | OUTPATIENT
Start: 2025-05-28

## 2025-05-27 RX ORDER — VERAPAMIL HYDROCHLORIDE 2.5 MG/ML
INJECTION INTRAVENOUS PRN
Status: DISCONTINUED | OUTPATIENT
Start: 2025-05-27 | End: 2025-05-27 | Stop reason: HOSPADM

## 2025-05-27 RX ORDER — IOPAMIDOL 755 MG/ML
INJECTION, SOLUTION INTRAVASCULAR PRN
Status: DISCONTINUED | OUTPATIENT
Start: 2025-05-27 | End: 2025-05-27 | Stop reason: HOSPADM

## 2025-05-27 RX ORDER — MIDAZOLAM 1 MG/ML
INJECTION INTRAMUSCULAR; INTRAVENOUS PRN
Status: DISCONTINUED | OUTPATIENT
Start: 2025-05-27 | End: 2025-05-27 | Stop reason: HOSPADM

## 2025-05-27 RX ORDER — SODIUM CHLORIDE 0.9 % (FLUSH) 0.9 %
5-40 SYRINGE (ML) INJECTION EVERY 12 HOURS SCHEDULED
Status: DISCONTINUED | OUTPATIENT
Start: 2025-05-27 | End: 2025-05-27 | Stop reason: HOSPADM

## 2025-05-27 RX ADMIN — SODIUM CHLORIDE, PRESERVATIVE FREE 10 ML: 5 INJECTION INTRAVENOUS at 09:08

## 2025-05-27 RX ADMIN — POTASSIUM CHLORIDE 40 MEQ: 1500 TABLET, EXTENDED RELEASE ORAL at 12:42

## 2025-05-27 RX ADMIN — CARVEDILOL 12.5 MG: 12.5 TABLET, FILM COATED ORAL at 09:07

## 2025-05-27 RX ADMIN — LOSARTAN POTASSIUM 25 MG: 25 TABLET, FILM COATED ORAL at 09:07

## 2025-05-27 RX ADMIN — ACETAMINOPHEN 650 MG: 325 TABLET ORAL at 10:44

## 2025-05-27 RX ADMIN — Medication 1000 UNITS: at 09:07

## 2025-05-27 RX ADMIN — SODIUM CHLORIDE: 0.9 INJECTION, SOLUTION INTRAVENOUS at 09:40

## 2025-05-27 RX ADMIN — DORZOLAMIDE HYDROCHLORIDE AND TIMOLOL MALEATE 1 DROP: 20; 5 SOLUTION/ DROPS OPHTHALMIC at 09:08

## 2025-05-27 ASSESSMENT — PAIN SCALES - WONG BAKER: WONGBAKER_NUMERICALRESPONSE: NO HURT

## 2025-05-27 NOTE — CARE COORDINATION
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 He denies needs and has transportation home    Patient has a qualifying diagnosis for cardiac rehabilitation.     Education provided to patient regarding the health benefits of participating in a cardiac rehabilitation program. Handout provided with an overview of cardiac rehabilitation from the [employer].     Patient agreeable: Yes    Freedom of choice provided.     Referral made to :   [] [facility]  [x] [facility]  [] [facility]  [] [facility]  [] [facility]  [] Other        Rossi Lieberman, OSMAN  May 27, 2025

## 2025-05-27 NOTE — DISCHARGE SUMMARY
[facility]  Office: [phone]  Christian Troy DO, Mauricio Tinoco, DO, Dinesh Jiménez DO, Ortega Thomas, DO, Diomedes Galan MD, Paula Crowe MD, Yoshi Bernabe MD, Shania Montgomery MD,  Arnaldo Guzman MD, Bowen Mims MD, Adri Wayne MD,  Merlene Granda DO, Sergio Zaidi MD, Delmer Ho MD, Joel Troy DO, Radha Brooks MD,  Ernst Anderson DO, Mona Flores MD, Elvira Garza MD, Guille Westbrook MD,  Severino Mcmillan MD, Gregorio Kapadia MD, Jaime Colón MD, Leodan Liang MD, Gonsalo Mario MD, Mikaela Snowden MD, Rodo Gardner, DO, Suze Zambrano MD, Daivd Shipman DO, Yao Arguello MD, Merlene Ramirez MD, Mohsin Reza, MD, Wesley Wang MD, Shirley Waterhouse, CNP,  Rand Koroma, CNP, Rodo Perales, CNP,  Ayleen Alberts, BELEN, Christa Che, CNP, Lizett Land, CNP, Swapna Guerra, CNP, Danielle Sofia, CNP, Meli Romeo, PA-C, Yvonne Allan, CNP, Lesly Schilling, CNP,  Becca Sun, CNP, Beatrice Allen, CNP, Dennis Michel, PA-C, Mariann Mcdowell, CNP,  Andria Cox, CNS, Aicha Anand, CNP, Noemi Tracy, CNP,   Trinity Mac, CNP         [facility]   IN-PATIENT SERVICE   [facility]    Discharge Summary     Patient ID: Joel Guzmán  :  1945   MRN: [id number]     ACCOUNT:  [id number]   Patient's PCP: No primary care provider on file.  Admit Date: 2025   Discharge Date: 2025   Length of Stay: 4  Code Status:  Full Code  Admitting Physician: Shania Montgomery MD  Discharge Physician: Leodan Liang MD     Active Discharge Diagnoses:     Hospital Problem Lists:  Principal Problem:    CAD, multiple vessel  Active Problems:    Acute ST elevation myocardial infarction (STEMI) of inferolateral wall (HCC)    Essential hypertension    Vitamin D deficiency    Hypertension    GERD without esophagitis    Mixed hyperlipidemia    STEMI (ST elevation myocardial infarction) (HCC)    Elevated troponin    Glaucoma    Acute coronary syndrome

## 2025-05-27 NOTE — PLAN OF CARE
Ok to discharge patient today at 5 pm from cardiac standpoint, op follow up and cardiac rehab in 2-3 weeks.

## 2025-05-27 NOTE — PROGRESS NOTES
Cardiology progress note        Date:  5/24/2025  Patient: Joel Guzmán  Admission:  5/23/2025  8:40 PM  Admit DX: CAD, multiple vessel [I25.10]  Age:  80 y.o., 1945     LOS: 1 day     Reason for evaluation:   Atherosclerotic CAD status post PCI       SUBJECTIVE:     The patient was seen and examined. Notes and labs reviewed.    Patient denies any chest pain or shortness of breath    We have reviewed images with CT surgery and agree that LAD is a small vessel and is a poor target for bypass surgery therefore we will proceed with PCI to left circumflex/OM and possibly consider balloon angioplasty to the LAD with drug-coated balloons    OBJECTIVE:      EXAM:   Vitals:    VITALS:  BP (!) 98/53   Pulse 86   Temp 98.1 °F (36.7 °C) (Oral)   Resp 28   Wt 85 kg (187 lb 6.3 oz)   SpO2 96%   BMI 26.89 kg/m²    24HR INTAKE/OUTPUT:    Intake/Output Summary (Last 24 hours) at 5/24/2025 1202  Last data filed at 5/24/2025 0715  Gross per 24 hour   Intake 27 ml   Output 400 ml   Net -373 ml       General appearance: awake, alert, no apparent discomfort  HEENT: atraumatic, normocephalic, no JVD, no significant carotid bruit  Lungs: clear to auscultation bilaterally  Heart: regular rate and rhythm, S1, S2, 2/6 systolic murmur  Abdomen: soft, non-tender; bowel sounds active  Extremities: no significant lower extremity edema, right wrist area is soft and nontender with 2+ radial pulse  Neurologic: no obvious neurologic deficit    Current Inpatient Medications:   [START ON 5/25/2025] ticagrelor  90 mg Oral BID    ticagrelor  180 mg Oral Once    aspirin  81 mg Oral Daily    carvedilol  25 mg Oral BID WC    dorzolamide-timolol  1 drop Both Eyes BID    latanoprost  1 drop Both Eyes Nightly    Vitamin D  1,000 Units Oral Daily    atorvastatin  80 mg Oral Nightly    losartan  25 mg Oral Daily    sodium chloride flush  5-40 mL IntraVENous 2 times per day       IV Infusions (if any):   sodium chloride      cangrelor 
              Cardiology progress note        Date:  5/25/2025  Patient: Joel Guzmán  Admission:  5/23/2025  8:40 PM  Admit DX: CAD, multiple vessel [I25.10]  Age:  80 y.o., 1945     LOS: 2 days     Reason for evaluation:   Atherosclerotic CAD status post PCI       SUBJECTIVE:     The patient was seen and examined. Notes and labs reviewed.  No acute events overnight   Patient denies any chest pain or shortness of breath      OBJECTIVE:      EXAM:   Vitals:    VITALS:  /62   Pulse 84   Temp 98.2 °F (36.8 °C) (Oral)   Resp 18   Ht 1.778 m (5' 10\")   Wt 85.7 kg (189 lb)   SpO2 94%   BMI 27.12 kg/m²    24HR INTAKE/OUTPUT:    Intake/Output Summary (Last 24 hours) at 5/25/2025 1425  Last data filed at 5/25/2025 0901  Gross per 24 hour   Intake 230 ml   Output --   Net 230 ml       General appearance: awake, alert, no apparent discomfort  HEENT: atraumatic, normocephalic, no JVD, no significant carotid bruit  Lungs: clear to auscultation bilaterally  Heart: regular rate and rhythm, S1, S2, 2/6 systolic murmur  Abdomen: soft, non-tender; bowel sounds active  Extremities: no significant lower extremity edema, right wrist area is soft and nontender with 2+ radial pulse  Neurologic: no obvious neurologic deficit    Current Inpatient Medications:   ticagrelor  90 mg Oral BID    aspirin  81 mg Oral Daily    carvedilol  25 mg Oral BID     dorzolamide-timolol  1 drop Both Eyes BID    latanoprost  1 drop Both Eyes Nightly    Vitamin D  1,000 Units Oral Daily    atorvastatin  80 mg Oral Nightly    losartan  25 mg Oral Daily    sodium chloride flush  5-40 mL IntraVENous 2 times per day       IV Infusions (if any):   sodium chloride         Diagnostics:   Telemetry: Sinus rhythm     EKG 5/22/2025  Sinus tachycardia 122 bpm inferior/anterior wall MI     Cardiac catheterization 5/23/2025  PCI to 100% occluded RCA with 3.0 x 34 mm drug-eluting [place] frontier stent  Ostial LAD 85%, mid 99%, diagonal 90%  Proximal LCx 
CLINICAL PHARMACY NOTE: MEDS TO BEDS    Total # of Prescriptions Filled: 3   The following medications were delivered to the patient:  Brilinta 90mg  Losartan 25mg  Atorvastatin 80mg    Additional Documentation: delivered to patient in room 2016 5/27 at 3:15pm. No co-pay.  
[facility]  Office: [phone]  Christian Troy DO, Mauricio Tinoco, DO, Dinesh Jiménez DO, Ortega Thomas DO, Diomedes Galan MD, Paula Crowe MD, Yoshi Bernabe MD, Shania Montgomery MD,  Arnaldo Guzman MD, Bowen Mims MD, Adri Wayne MD,  Merlene Granda DO, Sergio Zaidi MD, Delmer Ho MD, Joel Troy DO, Radha Brooks MD,  Ernst Anderson DO, Mona Flores MD, Elvira Garza MD, Guille Westbrook MD,  Severino Mcmillan MD, Gregorio Kapadia MD, Jaime Colón MD, Leodan Liang MD, Gonsalo Mario MD, Mikaela Snowden MD, Rodo Gardner DO, Suze Zambrano MD, Yao Arguello MD, Merlene Ramirez MD, Mohsin Reza, MD, Wesley Wang MD, Shirley Waterhouse, CNP,  Rand Koroma, CNP, Rodo Perales, CNP,  Ayleen Alberts, DNP, Christa Che, CNP, Lizett Land, CNP, Swapna Guerra, CNP, Danielle Sofia, CNP, Meli Romeo, PA-C, Yvonne Allan, CNP, Lesly Schilling, CNP,  Becca Sun, CNP, Beatrice Allen, CNP, Dennis Michel, PA-C, Mariann Mcdowell, CNP,  Andria Cox, CNS, Aicha Anand, CNP, Noemi Tracy, CNP,   Trinity Mac, CNP         [facility]   IN-PATIENT SERVICE   [facility]    Progress Note    5/27/2025    1:25 PM    Name:   Joel Guzmán  MRN:     [id number]     Acct:      [id number]   Room:   2016/2016-01  IP Day:  4  Admit Date:  5/23/2025  8:40 PM    PCP:   No primary care provider on file.  Code Status:  Full Code    Subjective:     C/C:     Interval History Status: improved.     Patient seen and examined at bedside, patient denies any chest pain   Patient vitals, labs and all providers notes were reviewed,from overnight shift and morning updates were noted and discussed with the nurse    Brief History:     Per chart  Joel Guzmán is a 80 y.o. Non- / non  male pmh HTN, GERD,  and glaucoma  who presented to [facility] late last evening with complaints of left-sided neck pain and arm pain and back pain that started at 830pm last 
[facility]  Office: [phone]  Christian Troy DO, Mauricio Tinoco, DO, Dinesh Jiménez DO, Ortega Thomas DO, Diomedes Galan MD, Paula Crowe MD, Yoshi Bernabe MD, Shania Montgomery MD,  Arnaldo Guzman MD, Bowen Mims MD, Adri Wayne MD,  Merlene Granda DO, Sergio Zaidi MD, Delmer Ho MD, Joel Troy DO, Radha Brooks MD,  Ernst Anderson DO, Mona Flores MD, Elvira Garza MD, Guille Westbrook MD,  Severino Mcmillan MD, Gregorio Kapadia MD, Jaime Colón MD, Leodan Liang MD, Gonsalo Mario MD, Mikaela Snowden MD, Rodo Gardner DO, Suze Zambrano MD, Yao Arguello MD, Merlene Ramirez MD, Mohsin Reza, MD, Wesley Wang MD, Shirley Waterhouse, CNP,  Rand Koroma, CNP, Rodo Perales, CNP,  Ayleen Alberts, DNP, Christa Che, CNP, Lizett Land, CNP, Swapna Guerra, CNP, Danielle Sofia, CNP, Meli Romeo, PA-C, Yvonne Allan, CNP, Lesly Schilling, CNP,  Becca Sun, CNP, Beatrice Allen, CNP, Dennis Michel, PA-C, Mariann Mcdowell, CNP,  Andria Cox, CNS, Aicha Anand, CNP, Noemi Tracy, CNP,   Trinity Mac, CNP         [facility]   IN-PATIENT SERVICE   [facility]    Progress Note    5/24/2025    8:50 AM    Name:   Joel Guzmán  MRN:     [id number]     Acct:      [id number]   Room:   1023/1023-01   Day:  1  Admit Date:  5/23/2025  8:40 PM    PCP:   No primary care provider on file.  Code Status:  Full Code    Subjective:     C/C:     Interval History Status: improved.     Patient seen and examined at bedside, no acute events overnight.  Feeling better overall   Off NTG drip , still on Cangrelor drip   Patient denies any chest pain, shortness of breath, chills, fevers, nausea or vomiting.  Patient vitals, labs and all providers notes were reviewed,from overnight shift and morning updates were noted and discussed with the nurse      Brief History:     Per chart  Joel Guzmán is a 80 y.o. Non- / non  male pmh HTN, GERD,  and 
stent  Ostial LAD 85%, mid 99%, diagonal 90%  Proximal LCx 90%, OM1 85%     Echocardiogram 5/23/2025    Mildly reduced left ventricular systolic function with EF 45%. Mild hypokinesis of the apex. Hypokinesis of the following segments: mid inferior and mid inferoseptal. Abnormal diastolic function.    Right ventricle size is normal. Normal systolic function.    Normal RVSP. RVSP is 10 mmHg.    Image quality is good. Contrast used: Lumason.     Echocardiogram 4/14/2025    Normal left ventricular systolic function with EF 55 - 60%. Mildly increased wall thickness. Normal wall motion.    Right ventricle size is normal. Normal systolic function.     Aortic Valve: Mild aortic regurgitation.    Mild tricuspid regurgitation. Normal RVSP 29 mmHg.     Lexiscan nuclear stress test 4/14/2025  No clear scintigraphic evidence for reversible ischemia or infarct.   Left ventricular ejection fraction  61%      Echocardiogram 12/13/2016  · Normal left ventricular systolic function with EF 60%.   · Normal size right ventricle. Normal right ventricular systolic function.   · Mild tricuspid valve regurgitation. The RVSP is normal.   · Small pericardial effusion. No evidence of hemodynamic compromise      Exercise stress test 12/13/2016  Baseline ECG indicates sinus rhythm.  There was no ST segment deviation noted during stress.   Arrhythmias during stress: none.   Overall, the patient's functional capacity was average.   The stress ECG was negative.   The calculated Duke Treadmill Score of 8 represents a low risk only with regards to the exercise findings.     Labs:   CBC:  Recent Labs     05/24/25 0311 05/26/25  0536   WBC 16.4* 9.7   HGB 12.1* 11.5*   HCT 35.4* 33.9*    181     Magnesium:  Recent Labs     05/24/25 0311 05/26/25  0536   MG 1.9 2.0     BMP:  Recent Labs     05/24/25 0311 05/26/25  0536    142   K 3.9 3.6*   CALCIUM 8.9 8.9   CO2 20 22   BUN 18 21   CREATININE 0.9 0.8   LABGLOM 87 >90   GLUCOSE 128* 100* 
21 20   GLUCOSE 119*  --  165* 128*   BUN 18  --  19 18   CREATININE 1.1  --  1.0 0.9   MG  --   --   --  1.9   ANIONGAP 14  --  10 11   LABGLOM 71  --  75 87   CALCIUM 10.1  --  8.3* 8.9   TROPHS 113* 107* 1,932*  --      Recent Labs     05/24/25  0311   CHOL 140   HDL 45   CHOLHDLRATIO 3.1   TRIG 77   VLDL 15     ABG:No results found for: \"POCPH\", \"PHART\", \"PH\", \"POCPCO2\", \"VZX9RME\", \"PCO2\", \"POCPO2\", \"PO2ART\", \"PO2\", \"POCHCO3\", \"WBR8EMR\", \"HCO3\", \"NBEA\", \"PBEA\", \"BEART\", \"BE\", \"THGBART\", \"THB\", \"EOI7QSE\", \"OGCH0FMK\", \"N0WZZCRO\", \"O2SAT\", \"FIO2\"  Lab Results   Component Value Date/Time    SPECIAL LT AC 10ML 12/02/2020 04:50 AM     Lab Results   Component Value Date/Time    CULTURE NO GROWTH 6 DAYS 12/02/2020 04:50 AM       Radiology:  XR CHEST PORTABLE  Result Date: 5/23/2025  No acute cardiopulmonary process.       Physical Examination:        Physical Exam  Vitals and nursing note reviewed.   Constitutional:       General: He is not in acute distress.  HENT:      Head: Normocephalic and atraumatic.   Eyes:      Conjunctiva/sclera: Conjunctivae normal.      Pupils: Pupils are equal, round, and reactive to light.   Cardiovascular:      Rate and Rhythm: Normal rate and regular rhythm.      Heart sounds: No murmur heard.  Pulmonary:      Effort: Pulmonary effort is normal. No accessory muscle usage or respiratory distress.      Breath sounds: No stridor. No decreased breath sounds, wheezing, rhonchi or rales.   Abdominal:      General: Bowel sounds are normal. There is no distension.      Palpations: Abdomen is soft. Abdomen is not rigid.      Tenderness: There is no abdominal tenderness. There is no guarding.   Musculoskeletal:         General: No tenderness.   Skin:     General: Skin is warm and dry.      Findings: No erythema, lesion or rash.   Neurological:      Mental Status: He is alert and oriented to person, place, and time.      Cranial Nerves: No cranial nerve deficit.      Motor: No seizure activity.

## 2025-05-27 NOTE — DISCHARGE INSTRUCTIONS
Please continue to take your medication as prescribed, continue to check your blood pressure regularly at home and follow-up with your primary doctor within 1 week of discharge to recheck your potassium level and to recheck your blood pressure and to adjust your home medication if needed, follow-up with cardiology after discharge

## 2025-05-28 ENCOUNTER — CARE COORDINATION (OUTPATIENT)
Dept: CARE COORDINATION | Age: 80
End: 2025-05-28

## 2025-05-29 ENCOUNTER — CARE COORDINATION (OUTPATIENT)
Dept: CARE COORDINATION | Age: 80
End: 2025-05-29

## 2025-05-29 NOTE — CONSULTS
Came to see patient . Advised by RN he is being transferred to [facility]/ consult no longer needed      Jeffrey Joya MD

## 2025-05-29 NOTE — CARE COORDINATION
Care Transitions Note    Initial Call attempt #2- Call within 2 business days of discharge: Yes  Attempted to reach patient, spouse/partner  for transitions of care follow up. Unable to reach patient, spouse/partner .    Outreach Attempts:   HIPAA compliant voicemail left for patient, spouse/partner .     Patient: Joel Guzmán                                    Patient : 1945   MRN: [id number]                               Reason for Admission: Inferolateral STEMI with CX/OM stenting  Discharge Date: 25         RURS: Readmission Risk Score: 11     Transferred from [facility] to [facility] on -. Inferolateral STEMI.  Recent RCA stent,  CX/OM stenting.  Discharged home with spouse - lipitor 80mg + losartan 25mg qd + Brilinta 90mg twice daily added.  Plan for cardiac rehab.    Last Discharge Facility       Date Complaint Diagnosis Description Type Department Provider    25  Status post coronary artery stent placement ... Admission (Discharged) [facility] CAR 2 Leodan Liang MD            Was this an external facility discharge? No    Follow Up Appointment:   Patient has hospital follow up appointment scheduled greater than 14 days after discharge; cardio [facility].    Future Appointments         Provider Specialty Dept Phone    2025  8:00 AM [facility] CARDIAC REHAB RM 01 Cardiac Rehabilitation [phone]    2025 12:50 PM Ramiro Sethi MD Cardiology [phone]                This message was received back from previous [facility] PCP office when HFU was requested.   - No longer under prescriber care.     CT program ended if no return call back      Dang Bermudez RN

## 2025-06-05 PROBLEM — I21.3 STEMI (ST ELEVATION MYOCARDIAL INFARCTION) (HCC): Status: RESOLVED | Noted: 2025-05-23 | Resolved: 2025-06-05

## 2025-06-05 PROBLEM — I25.2 HISTORY OF ACUTE MYOCARDIAL INFARCTION: Status: ACTIVE | Noted: 2025-05-23

## 2025-06-22 PROBLEM — R79.89 ELEVATED TROPONIN: Status: RESOLVED | Noted: 2025-05-23 | Resolved: 2025-06-22

## 2025-07-17 ENCOUNTER — OFFICE VISIT (OUTPATIENT)
Age: 80
End: 2025-07-17

## 2025-07-17 VITALS
DIASTOLIC BLOOD PRESSURE: 71 MMHG | WEIGHT: 175 LBS | SYSTOLIC BLOOD PRESSURE: 164 MMHG | OXYGEN SATURATION: 94 % | BODY MASS INDEX: 25.05 KG/M2 | HEART RATE: 82 BPM | HEIGHT: 70 IN

## 2025-07-17 DIAGNOSIS — I10 ESSENTIAL HYPERTENSION: ICD-10-CM

## 2025-07-17 DIAGNOSIS — I25.10 CORONARY ARTERY DISEASE INVOLVING NATIVE CORONARY ARTERY OF NATIVE HEART WITHOUT ANGINA PECTORIS: Primary | ICD-10-CM

## 2025-07-17 DIAGNOSIS — K21.9 GERD WITHOUT ESOPHAGITIS: ICD-10-CM

## 2025-07-17 DIAGNOSIS — E78.5 DYSLIPIDEMIA: ICD-10-CM

## 2025-07-17 RX ORDER — PRASUGREL 10 MG/1
10 TABLET, FILM COATED ORAL DAILY
Qty: 90 TABLET | Refills: 3 | Status: SHIPPED | OUTPATIENT
Start: 2025-07-17

## 2025-07-17 RX ORDER — ATORVASTATIN CALCIUM 40 MG/1
40 TABLET, FILM COATED ORAL DAILY
Qty: 90 TABLET | Refills: 3 | Status: SHIPPED | OUTPATIENT
Start: 2025-07-17

## 2025-07-17 RX ORDER — LOSARTAN POTASSIUM 25 MG/1
25 TABLET ORAL DAILY
Qty: 90 TABLET | Refills: 3 | Status: SHIPPED | OUTPATIENT
Start: 2025-07-17

## 2025-07-17 RX ORDER — BIMATOPROST 0.1 MG/ML
1 SOLUTION/ DROPS OPHTHALMIC NIGHTLY
COMMUNITY
Start: 2025-05-14

## 2025-07-17 RX ORDER — EZETIMIBE 10 MG/1
10 TABLET ORAL DAILY
Qty: 90 TABLET | Refills: 3 | Status: SHIPPED | OUTPATIENT
Start: 2025-07-17

## 2025-07-17 RX ORDER — CARVEDILOL 12.5 MG/1
12.5 TABLET ORAL 2 TIMES DAILY WITH MEALS
Qty: 180 TABLET | Refills: 3 | Status: SHIPPED | OUTPATIENT
Start: 2025-07-17

## 2025-07-17 RX ORDER — ASPIRIN 81 MG/1
81 TABLET ORAL DAILY
Qty: 90 TABLET | Refills: 3 | Status: SHIPPED | OUTPATIENT
Start: 2025-07-17

## 2025-07-17 NOTE — PROGRESS NOTES
Cardiology Office Follow up      Joel Guzmán  1945  [id number]    Today: 7/17/25    CC: Patient is here for routine follow up visit after hospital discharge.    HPI:     This is a 80-year-old gentleman who presented the emergency room with complaints of chest pain and had inferior wall STEMI and underwent emergent PCI to RCA with drug-eluting stent at [facility]. He was also noted to have critical disease in the LAD and left circumflex arteries.  He was transferred to [facility] for further revascularization options CABG or PCI.  The case was discussed and it was noted that the LAD was not a good target therefore he underwent PCI to LCx and is here for follow-up after hospital discharge.    Patient states that he has been doing well and denies any chest pain, dyspnea, orthopnea, PND, palpitations or dizziness.    Patient states that ticagrelor was causing diarrhea therefore we are switching it to prasugrel at this point.  We have advised compliance with aspirin and prasugrel.  He is also been on atorvastatin and we are adding ezetimibe.    He is on carvedilol and losartan and we have requested him to keep a log of blood pressure and heart rate.    Past Medical:  Past Medical History:   Diagnosis Date    Acute ST elevation myocardial infarction (STEMI) of inferolateral wall (HCC) 05/23/2025    Allergic rhinitis     Coronary artery disease     GERD without esophagitis     Glaucoma     Hypertension     STEMI (ST elevation myocardial infarction) (HCC) 05/23/2025         Past Surgical:  Past Surgical History:   Procedure Laterality Date    CARDIAC CATHETERIZATION  2006    [facility]    CARDIAC PROCEDURE N/A 5/27/2025    minh / Percutaneous coronary intervention / rm 2016 performed by Kamini Gomes MD at [facility] CARDIAC CATH LAB    CARDIAC PROCEDURE N/A 5/23/2025    Left heart cath / coronary angiography performed by Roque Lewis MD at [facility] CARDIAC CATH LAB    CARDIAC

## (undated) DEVICE — CANNULA SEAL

## (undated) DEVICE — BAND COMPR L24CM REG CLR PLAS HEMSTAT EXT HK AND LOOP RETEN

## (undated) DEVICE — Device

## (undated) DEVICE — TROCARS: Brand: KII® BALLOON BLUNT TIP SYSTEM

## (undated) DEVICE — VALVE HEMSTAS W/ GWIRE INSRTN TOOL GRDIAN II NC

## (undated) DEVICE — GUIDEWIRE 35/260/FC/PTFE/3J: Brand: GUIDEWIRE

## (undated) DEVICE — TRAY SURG CUST CRD CATH TOLEDO

## (undated) DEVICE — GARMENT,MEDLINE,DVT,INT,CALF,MED, GEN2: Brand: MEDLINE

## (undated) DEVICE — Device: Brand: EAGLE EYE PLATINUM RX DIGITAL IVUS CATHETER

## (undated) DEVICE — BAG SPEC REM 224ML W4XL6IN DIA10MM 1 HND GYN DISP ENDOPCH

## (undated) DEVICE — GUIDEWIRE VASC J 3 MM 0.035 INX210 CM FIX COR INQWIRE

## (undated) DEVICE — SUTURE PDS II SZ 0 L27IN ABSRB VLT UR-6 L26MM 1/2 CIR D7185

## (undated) DEVICE — CATHETER ANGIO JL3.5 0.056 INX6 FRX100 CM THRULUMEN EXPO

## (undated) DEVICE — CATHETER DIAG SM AD 6FR L100CM 0.038IN COR POLYUR JUDKINS L

## (undated) DEVICE — HI-TORQUE VERSACORE STANDARD GUIDE WIRE SYSTEM 300 CM: Brand: HI-TORQUE VERSACORE

## (undated) DEVICE — INFLATION KIT CUST J REV

## (undated) DEVICE — TUBING, SUCTION, 1/4" X 12', STRAIGHT: Brand: MEDLINE

## (undated) DEVICE — PERCUTANEOUS ENTRY THINWALL NEEDLE  ONE-PART: Brand: COOK

## (undated) DEVICE — YANKAUER,FLEXIBLE HANDLE,REGLR CAPACITY: Brand: MEDLINE INDUSTRIES, INC.

## (undated) DEVICE — GLIDESHEATH SLENDER STAINLESS STEEL KIT: Brand: GLIDESHEATH SLENDER

## (undated) DEVICE — CATH BLLN ANGIO 2.50X12MM SC EUPHORA RX

## (undated) DEVICE — CATHETER GUID 6FR L100CM DIA0.071IN NYL SHFT EBU3.5

## (undated) DEVICE — RUNTHROUGH NS EXTRA FLOPPY PTCA GUIDEWIRE: Brand: RUNTHROUGH

## (undated) DEVICE — Z DUP USE 2641840 CLIP INT L POLYMER LOK LIG HEM O LOK

## (undated) DEVICE — ADHESIVE SKIN CLSR 0.7ML TOP DERMBND ADV

## (undated) DEVICE — CATH BLLN ANGIO 3X12MM NC EUPHORIA RX

## (undated) DEVICE — GLOVE SURG SZ 8 CRM LTX FREE POLYISOPRENE POLYMER BEAD ANTI

## (undated) DEVICE — CATHETER DIAG AD 6FR L100CM 0.038IN STD COR POLYUR JUDKINS

## (undated) DEVICE — SUCTION IRRIGATOR: Brand: ENDOWRIST

## (undated) DEVICE — ARM DRAPE

## (undated) DEVICE — SUTURE MCRYL SZ 4-0 L27IN ABSRB UD L19MM PS-2 1/2 CIR PRIM Y426H

## (undated) DEVICE — BLADELESS OBTURATOR: Brand: WECK VISTA

## (undated) DEVICE — BLANKET WRM W29.9XL79.1IN UP BODY FORC AIR MISTRAL-AIR

## (undated) DEVICE — POSITIONER HD W8XH4XL8.5IN RASPBERRY FOAM SLT

## (undated) DEVICE — GLOVE SURG SZ 75 CRM LTX FREE POLYISOPRENE POLYMER BEAD ANTI

## (undated) DEVICE — ST. ANNE'S MULTI PORT PACK: Brand: MEDLINE INDUSTRIES, INC.

## (undated) DEVICE — SUTURE SZ 0 27IN 5/8 CIR UR-6  TAPER PT VIOLET ABSRB VICRYL J603H

## (undated) DEVICE — CATHETER GUID JR4 0.070 INX6 FRX100 CM VISTA BRT TIP ADROIT